# Patient Record
Sex: FEMALE | Race: WHITE | Employment: FULL TIME | URBAN - METROPOLITAN AREA
[De-identification: names, ages, dates, MRNs, and addresses within clinical notes are randomized per-mention and may not be internally consistent; named-entity substitution may affect disease eponyms.]

---

## 2017-07-20 ENCOUNTER — HOSPITAL ENCOUNTER (OUTPATIENT)
Dept: CT IMAGING | Age: 37
Discharge: HOME | End: 2017-07-20
Attending: FAMILY MEDICINE
Payer: COMMERCIAL

## 2017-07-20 DIAGNOSIS — S14.109A INJURY OF CERVICAL SPINE, INITIAL ENCOUNTER (HCC): ICD-10-CM

## 2017-07-20 DIAGNOSIS — S34.109A INJURY OF LUMBAR SPINE, INITIAL ENCOUNTER (HCC): ICD-10-CM

## 2017-07-20 DIAGNOSIS — M54.6 ACUTE MIDLINE THORACIC BACK PAIN: ICD-10-CM

## 2017-07-20 PROCEDURE — 72131 CT LUMBAR SPINE W/O DYE: CPT

## 2019-12-28 ENCOUNTER — HOSPITAL ENCOUNTER (EMERGENCY)
Age: 39
Discharge: HOME | End: 2019-12-28
Attending: EMERGENCY MEDICINE
Payer: COMMERCIAL

## 2019-12-28 ENCOUNTER — APPOINTMENT (OUTPATIENT)
Dept: CT IMAGING | Age: 39
End: 2019-12-28
Attending: EMERGENCY MEDICINE
Payer: COMMERCIAL

## 2019-12-28 VITALS
HEART RATE: 74 BPM | DIASTOLIC BLOOD PRESSURE: 59 MMHG | OXYGEN SATURATION: 97 % | HEIGHT: 67 IN | SYSTOLIC BLOOD PRESSURE: 102 MMHG | TEMPERATURE: 99 F | RESPIRATION RATE: 17 BRPM | WEIGHT: 168 LBS | BODY MASS INDEX: 26.37 KG/M2

## 2019-12-28 DIAGNOSIS — R10.31 ABDOMINAL PAIN, RIGHT LOWER QUADRANT: Primary | ICD-10-CM

## 2019-12-28 LAB
ALBUMIN SERPL-MCNC: 4.3 G/DL (ref 3.5–5)
ALBUMIN/GLOB SERPL: 1.4 {RATIO} (ref 1.2–3.5)
ALP SERPL-CCNC: 51 U/L (ref 50–136)
ALT SERPL-CCNC: 24 U/L (ref 12–65)
ANION GAP SERPL CALC-SCNC: 9 MMOL/L (ref 7–16)
AST SERPL-CCNC: 14 U/L (ref 15–37)
BASOPHILS # BLD: 0 K/UL (ref 0–0.2)
BASOPHILS NFR BLD: 1 % (ref 0–2)
BILIRUB SERPL-MCNC: 0.7 MG/DL (ref 0.2–1.1)
BUN SERPL-MCNC: 11 MG/DL (ref 6–23)
CALCIUM SERPL-MCNC: 9.5 MG/DL (ref 8.3–10.4)
CHLORIDE SERPL-SCNC: 108 MMOL/L (ref 98–107)
CO2 SERPL-SCNC: 23 MMOL/L (ref 21–32)
CREAT SERPL-MCNC: 0.83 MG/DL (ref 0.6–1)
DIFFERENTIAL METHOD BLD: ABNORMAL
EOSINOPHIL # BLD: 0.1 K/UL (ref 0–0.8)
EOSINOPHIL NFR BLD: 1 % (ref 0.5–7.8)
ERYTHROCYTE [DISTWIDTH] IN BLOOD BY AUTOMATED COUNT: 11.5 % (ref 11.9–14.6)
GLOBULIN SER CALC-MCNC: 3 G/DL (ref 2.3–3.5)
GLUCOSE SERPL-MCNC: 124 MG/DL (ref 65–100)
HCG UR QL: NEGATIVE
HCT VFR BLD AUTO: 41.3 % (ref 35.8–46.3)
HGB BLD-MCNC: 13.9 G/DL (ref 11.7–15.4)
IMM GRANULOCYTES # BLD AUTO: 0 K/UL (ref 0–0.5)
IMM GRANULOCYTES NFR BLD AUTO: 0 % (ref 0–5)
LYMPHOCYTES # BLD: 1.2 K/UL (ref 0.5–4.6)
LYMPHOCYTES NFR BLD: 14 % (ref 13–44)
MCH RBC QN AUTO: 30.8 PG (ref 26.1–32.9)
MCHC RBC AUTO-ENTMCNC: 33.7 G/DL (ref 31.4–35)
MCV RBC AUTO: 91.4 FL (ref 79.6–97.8)
MONOCYTES # BLD: 0.3 K/UL (ref 0.1–1.3)
MONOCYTES NFR BLD: 3 % (ref 4–12)
NEUTS SEG # BLD: 6.9 K/UL (ref 1.7–8.2)
NEUTS SEG NFR BLD: 81 % (ref 43–78)
NRBC # BLD: 0 K/UL (ref 0–0.2)
PLATELET # BLD AUTO: 145 K/UL (ref 150–450)
PMV BLD AUTO: 11.6 FL (ref 9.4–12.3)
POTASSIUM SERPL-SCNC: 4.3 MMOL/L (ref 3.5–5.1)
PROT SERPL-MCNC: 7.3 G/DL (ref 6.3–8.2)
RBC # BLD AUTO: 4.52 M/UL (ref 4.05–5.2)
SODIUM SERPL-SCNC: 140 MMOL/L (ref 136–145)
WBC # BLD AUTO: 8.5 K/UL (ref 4.3–11.1)

## 2019-12-28 PROCEDURE — 99284 EMERGENCY DEPT VISIT MOD MDM: CPT | Performed by: EMERGENCY MEDICINE

## 2019-12-28 PROCEDURE — 96374 THER/PROPH/DIAG INJ IV PUSH: CPT | Performed by: EMERGENCY MEDICINE

## 2019-12-28 PROCEDURE — 74176 CT ABD & PELVIS W/O CONTRAST: CPT

## 2019-12-28 PROCEDURE — 80053 COMPREHEN METABOLIC PANEL: CPT

## 2019-12-28 PROCEDURE — 81003 URINALYSIS AUTO W/O SCOPE: CPT | Performed by: EMERGENCY MEDICINE

## 2019-12-28 PROCEDURE — 81025 URINE PREGNANCY TEST: CPT

## 2019-12-28 PROCEDURE — 85025 COMPLETE CBC W/AUTO DIFF WBC: CPT

## 2019-12-28 PROCEDURE — 74011250636 HC RX REV CODE- 250/636: Performed by: EMERGENCY MEDICINE

## 2019-12-28 PROCEDURE — 96375 TX/PRO/DX INJ NEW DRUG ADDON: CPT | Performed by: EMERGENCY MEDICINE

## 2019-12-28 PROCEDURE — 96361 HYDRATE IV INFUSION ADD-ON: CPT | Performed by: EMERGENCY MEDICINE

## 2019-12-28 RX ORDER — ONDANSETRON 4 MG/1
4 TABLET, ORALLY DISINTEGRATING ORAL
Qty: 5 TAB | Refills: 0 | Status: SHIPPED | OUTPATIENT
Start: 2019-12-28 | End: 2020-02-10

## 2019-12-28 RX ORDER — OXYCODONE HYDROCHLORIDE 5 MG/1
5 TABLET ORAL
Qty: 7 TAB | Refills: 0 | Status: SHIPPED | OUTPATIENT
Start: 2019-12-28 | End: 2019-12-31

## 2019-12-28 RX ORDER — ONDANSETRON 2 MG/ML
4 INJECTION INTRAMUSCULAR; INTRAVENOUS
Status: COMPLETED | OUTPATIENT
Start: 2019-12-28 | End: 2019-12-28

## 2019-12-28 RX ORDER — TAMSULOSIN HYDROCHLORIDE 0.4 MG/1
0.4 CAPSULE ORAL DAILY
Qty: 15 CAP | Refills: 0 | Status: SHIPPED | OUTPATIENT
Start: 2019-12-28 | End: 2020-01-12

## 2019-12-28 RX ORDER — DICLOFENAC POTASSIUM 50 MG/1
50 TABLET, FILM COATED ORAL
Qty: 10 TAB | Refills: 0 | Status: SHIPPED | OUTPATIENT
Start: 2019-12-28 | End: 2020-02-10

## 2019-12-28 RX ORDER — KETOROLAC TROMETHAMINE 30 MG/ML
15 INJECTION, SOLUTION INTRAMUSCULAR; INTRAVENOUS
Status: COMPLETED | OUTPATIENT
Start: 2019-12-28 | End: 2019-12-28

## 2019-12-28 RX ADMIN — KETOROLAC TROMETHAMINE 15 MG: 30 INJECTION, SOLUTION INTRAMUSCULAR at 04:53

## 2019-12-28 RX ADMIN — SODIUM CHLORIDE 1000 ML: 900 INJECTION, SOLUTION INTRAVENOUS at 04:52

## 2019-12-28 RX ADMIN — ONDANSETRON 4 MG: 2 INJECTION INTRAMUSCULAR; INTRAVENOUS at 04:52

## 2019-12-28 NOTE — ED TRIAGE NOTES
Reports RLQ abdominal pain, n/v, chills. Onset approx 2200 last night. Denies diarrhea, urinary symptoms. Last normal BM 12/25 however reports constipation prior.

## 2019-12-28 NOTE — ED NOTES
I have reviewed discharge instructions with the patient. The patient verbalized understanding. Patient left ED via Discharge Method: ambulatory to Home with family. Opportunity for questions and clarification provided. Patient given 3 scripts. To continue your aftercare when you leave the hospital, you may receive an automated call from our care team to check in on how you are doing. This is a free service and part of our promise to provide the best care and service to meet your aftercare needs.  If you have questions, or wish to unsubscribe from this service please call [phone]. Thank you for Choosing our [address] [place] [address] Emergency Department.

## 2019-12-28 NOTE — ED PROVIDER NOTES
Patient is a 41 yo female who presents with abdominal pain. States pain in RLQ/right flank and began about 5-6 hours ago fairly suddenly. States no history of kidney stones and states recently seen by gynecology and no concerning ovarian cysts. States she has had similar pain in the past.  States associated nausea without vomiting at this time. No chest pain or SOB, no vaginal bleeding or discharge. Abdominal Pain    Associated symptoms include nausea. Pertinent negatives include no fever, no diarrhea, no vomiting, no dysuria, no headaches, no chest pain and no back pain.         Past Medical History:   Diagnosis Date    Abnormal Pap smear     Abnormal uterine bleeding (AUB) 2015    pt reports placenta accreta possibly    Adopted     Anemia     pt reports AUB, last hgb in 2015 was 7, today hgb 13 (9/25/15)    Missed      Postpartum depression     no medication at this time       Past Surgical History:   Procedure Laterality Date    HX DILATION AND CURETTAGE      HX DILATION AND EVACUATION  2015    with retained placenta    HX HYSTERECTOMY  9/29/15 / 2016    still has ovaries;  tubes removed     HX WISDOM TEETH EXTRACTION           Family History:   Problem Relation Age of Onset    Diabetes Mother         breast    Cancer Maternal Aunt         great maternal aunt  breast     Cancer Maternal Grandmother         breast  under 48       Social History     Socioeconomic History    Marital status:      Spouse name: Not on file    Number of children: Not on file    Years of education: Not on file    Highest education level: Not on file   Occupational History    Not on file   Social Needs    Financial resource strain: Not on file    Food insecurity:     Worry: Not on file     Inability: Not on file    Transportation needs:     Medical: Not on file     Non-medical: Not on file   Tobacco Use    Smoking status: Never Smoker    Smokeless tobacco: Never Used   Substance and Sexual Activity    Alcohol use: No    Drug use: No    Sexual activity: Yes   Lifestyle    Physical activity:     Days per week: Not on file     Minutes per session: Not on file    Stress: Not on file   Relationships    Social connections:     Talks on phone: Not on file     Gets together: Not on file     Attends Shinto service: Not on file     Active member of club or organization: Not on file     Attends meetings of clubs or organizations: Not on file     Relationship status: Not on file    Intimate partner violence:     Fear of current or ex partner: Not on file     Emotionally abused: Not on file     Physically abused: Not on file     Forced sexual activity: Not on file   Other Topics Concern    Not on file   Social History Narrative    Not on file         ALLERGIES: Lexapro [escitalopram] and Lortab [hydrocodone-acetaminophen]    Review of Systems   Constitutional: Negative for chills and fever. HENT: Negative for rhinorrhea and sore throat. Eyes: Negative for visual disturbance. Respiratory: Negative for cough and shortness of breath. Cardiovascular: Negative for chest pain and leg swelling. Gastrointestinal: Positive for abdominal pain and nausea. Negative for diarrhea and vomiting. Genitourinary: Positive for flank pain and pelvic pain. Negative for dysuria. Musculoskeletal: Negative for back pain and neck pain. Skin: Negative for rash. Neurological: Negative for weakness and headaches. Psychiatric/Behavioral: The patient is not nervous/anxious. Vitals:    12/28/19 0350   BP: (!) 156/110   Pulse: 82   Resp: 22   Temp: 97.9 °F (36.6 °C)   SpO2: 97%   Weight: 76.2 kg (168 lb)   Height: 5' 7\" (1.702 m)            Physical Exam  Vitals signs and nursing note reviewed. Constitutional:       Appearance: She is well-developed. HENT:      Head: Normocephalic.       Right Ear: External ear normal.      Left Ear: External ear normal.   Eyes:      Conjunctiva/sclera: Conjunctivae normal.      Pupils: Pupils are equal, round, and reactive to light. Neck:      Musculoskeletal: Normal range of motion and neck supple. Trachea: No tracheal deviation. Cardiovascular:      Rate and Rhythm: Normal rate and regular rhythm. Heart sounds: Normal heart sounds. No murmur. Pulmonary:      Effort: Pulmonary effort is normal. No respiratory distress. Breath sounds: Normal breath sounds. Abdominal:      Palpations: Abdomen is soft. Tenderness: There is tenderness (rlq tenderness and mild right flank tenderness). Musculoskeletal: Normal range of motion. Skin:     Findings: No rash. Neurological:      Mental Status: She is alert and oriented to person, place, and time. Cranial Nerves: No cranial nerve deficit.           MDM  Number of Diagnoses or Management Options  Abdominal pain, right lower quadrant: new and requires workup     Amount and/or Complexity of Data Reviewed  Clinical lab tests: ordered and reviewed  Tests in the radiology section of CPT®: ordered and reviewed  Tests in the medicine section of CPT®: ordered and reviewed  Review and summarize past medical records: yes    Risk of Complications, Morbidity, and/or Mortality  Presenting problems: high  Diagnostic procedures: high  Management options: high    Patient Progress  Patient progress: stable         Procedures  Recent Results (from the past 12 hour(s))   CBC WITH AUTOMATED DIFF    Collection Time: 12/28/19  4:01 AM   Result Value Ref Range    WBC 8.5 4.3 - 11.1 K/uL    RBC 4.52 4.05 - 5.2 M/uL    HGB 13.9 11.7 - 15.4 g/dL    HCT 41.3 35.8 - 46.3 %    MCV 91.4 79.6 - 97.8 FL    MCH 30.8 26.1 - 32.9 PG    MCHC 33.7 31.4 - 35.0 g/dL    RDW 11.5 (L) 11.9 - 14.6 %    PLATELET 116 (L) 737 - 450 K/uL    MPV 11.6 9.4 - 12.3 FL    ABSOLUTE NRBC 0.00 0.0 - 0.2 K/uL    DF AUTOMATED      NEUTROPHILS 81 (H) 43 - 78 %    LYMPHOCYTES 14 13 - 44 %    MONOCYTES 3 (L) 4.0 - 12.0 %    EOSINOPHILS 1 0.5 - 7.8 % BASOPHILS 1 0.0 - 2.0 %    IMMATURE GRANULOCYTES 0 0.0 - 5.0 %    ABS. NEUTROPHILS 6.9 1.7 - 8.2 K/UL    ABS. LYMPHOCYTES 1.2 0.5 - 4.6 K/UL    ABS. MONOCYTES 0.3 0.1 - 1.3 K/UL    ABS. EOSINOPHILS 0.1 0.0 - 0.8 K/UL    ABS. BASOPHILS 0.0 0.0 - 0.2 K/UL    ABS. IMM. GRANS. 0.0 0.0 - 0.5 K/UL   METABOLIC PANEL, COMPREHENSIVE    Collection Time: 12/28/19  4:01 AM   Result Value Ref Range    Sodium 140 136 - 145 mmol/L    Potassium 4.3 3.5 - 5.1 mmol/L    Chloride 108 (H) 98 - 107 mmol/L    CO2 23 21 - 32 mmol/L    Anion gap 9 7 - 16 mmol/L    Glucose 124 (H) 65 - 100 mg/dL    BUN 11 6 - 23 MG/DL    Creatinine 0.83 0.6 - 1.0 MG/DL    GFR est AA >60 >60 ml/min/1.73m2    GFR est non-AA >60 >60 ml/min/1.73m2    Calcium 9.5 8.3 - 10.4 MG/DL    Bilirubin, total 0.7 0.2 - 1.1 MG/DL    ALT (SGPT) 24 12 - 65 U/L    AST (SGOT) 14 (L) 15 - 37 U/L    Alk. phosphatase 51 50 - 136 U/L    Protein, total 7.3 6.3 - 8.2 g/dL    Albumin 4.3 3.5 - 5.0 g/dL    Globulin 3.0 2.3 - 3.5 g/dL    A-G Ratio 1.4 1.2 - 3.5     HCG URINE, QL. - POC    Collection Time: 12/28/19  4:14 AM   Result Value Ref Range    Pregnancy test,urine (POC) NEGATIVE  NEG       Ct Urogram Wo Cont    Result Date: 12/28/2019  CT ABDOMEN AND PELVIS WITHOUT CONTRAST HISTORY: Right lower quadrant pain COMPARISON: None TECHNIQUE: Helical imaging was performed from the lung bases through the ischial tuberosities without intravenous contrast. Oral contrast was not administered. Coronal and sagittal reformats were performed. Dose reduction techniques used: Automated exposure control, adjustment of the mAs and/or kVp according to patient's size, and iterative reconstruction techniques. FINDINGS: *  LUNG BASES: Within normal limits. *  LIVER: Within normal limits. *  GALLBLADDER AND BILE DUCTS: Normal. *  SPLEEN: Within normal limits. *  URINARY TRACT: Mild right hydronephrosis likely secondary to a 1 mm distal ureteral stone. *  ADRENALS: Within normal limits.  *  PANCREAS: Within normal limits. *  GASTROINTESTINAL TRACT: Normal appendix. *  RETROPERITONEUM: Within normal limits. *  PERITONEAL CAVITY AND ABDOMINAL WALL: Within normal limits. *  PELVIS: Within normal limits. *  SPINE / BONES: Within normal limits. *  OTHER COMMENTS: None. IMPRESSION: Mild right hydronephrosis likely secondary to a 1 mm distal ureteral stone. Evaluation of the abdominal viscera is limited without intravenous contrast. Date of Dictation: 12/28/2019 4:35 AM     41 yo female with kidney stone:    Patients pain treated with toradol in ED and home on short course pain medication as well as flomax. Urine without signs of infection, labs otherwise reassuring. Patient to follow up with urology in 2-3 days for recheck or return with any continued or worsening pain or any further concerns.

## 2022-07-11 ENCOUNTER — TELEPHONE (OUTPATIENT)
Dept: FAMILY MEDICINE CLINIC | Facility: CLINIC | Age: 42
End: 2022-07-11

## 2022-07-11 NOTE — TELEPHONE ENCOUNTER
----- Message from Fish Isbell MA sent at 7/11/2022  9:36 AM EDT -----  Subject: Message to Provider    QUESTIONS  Information for Provider? Patient would like for Dr. Sabine Ventura to see if he   could establish care for her , Salma Neumann. She has an appointment   with you on 9/29/22. Please call to let her know if this is possible.    Thanks.  ---------------------------------------------------------------------------  --------------  Surendra Rosales VVDR  [phone]; OK to leave message on voicemail  ---------------------------------------------------------------------------  --------------  SCRIPT ANSWERS  undefined

## 2022-09-29 ENCOUNTER — OFFICE VISIT (OUTPATIENT)
Dept: FAMILY MEDICINE CLINIC | Facility: CLINIC | Age: 42
End: 2022-09-29
Payer: COMMERCIAL

## 2022-09-29 ENCOUNTER — NURSE ONLY (OUTPATIENT)
Dept: FAMILY MEDICINE CLINIC | Facility: CLINIC | Age: 42
End: 2022-09-29

## 2022-09-29 VITALS
SYSTOLIC BLOOD PRESSURE: 126 MMHG | TEMPERATURE: 97.7 F | WEIGHT: 183.1 LBS | HEIGHT: 67 IN | OXYGEN SATURATION: 99 % | BODY MASS INDEX: 28.74 KG/M2 | DIASTOLIC BLOOD PRESSURE: 77 MMHG | RESPIRATION RATE: 18 BRPM | HEART RATE: 78 BPM

## 2022-09-29 DIAGNOSIS — Z00.00 WELL ADULT HEALTH CHECK: Primary | ICD-10-CM

## 2022-09-29 DIAGNOSIS — R63.5 WEIGHT GAIN: ICD-10-CM

## 2022-09-29 DIAGNOSIS — Z11.59 NEED FOR HEPATITIS C SCREENING TEST: ICD-10-CM

## 2022-09-29 DIAGNOSIS — Z13.220 LIPID SCREENING: ICD-10-CM

## 2022-09-29 DIAGNOSIS — Z11.4 ENCOUNTER FOR SCREENING FOR HIV: ICD-10-CM

## 2022-09-29 DIAGNOSIS — E66.3 OVERWEIGHT WITH BODY MASS INDEX (BMI) OF 28 TO 28.9 IN ADULT: ICD-10-CM

## 2022-09-29 DIAGNOSIS — D23.30 DERMOID CYST OF FACE: ICD-10-CM

## 2022-09-29 DIAGNOSIS — Z13.1 DIABETES MELLITUS SCREENING: ICD-10-CM

## 2022-09-29 DIAGNOSIS — Z00.00 WELL ADULT HEALTH CHECK: ICD-10-CM

## 2022-09-29 DIAGNOSIS — Z12.31 VISIT FOR SCREENING MAMMOGRAM: ICD-10-CM

## 2022-09-29 LAB
25(OH)D3 SERPL-MCNC: 26.6 NG/ML (ref 30–100)
ALBUMIN SERPL-MCNC: 4.9 G/DL (ref 3.5–5)
ALBUMIN/GLOB SERPL: 2.1 {RATIO} (ref 1.2–3.5)
ALP SERPL-CCNC: 49 U/L (ref 50–136)
ALT SERPL-CCNC: 21 U/L (ref 12–65)
ANION GAP SERPL CALC-SCNC: 6 MMOL/L (ref 4–13)
AST SERPL-CCNC: 8 U/L (ref 15–37)
BASOPHILS # BLD: 0 K/UL (ref 0–0.2)
BASOPHILS NFR BLD: 1 % (ref 0–2)
BILIRUB SERPL-MCNC: 0.8 MG/DL (ref 0.2–1.1)
BUN SERPL-MCNC: 14 MG/DL (ref 6–23)
CALCIUM SERPL-MCNC: 9.7 MG/DL (ref 8.3–10.4)
CHLORIDE SERPL-SCNC: 106 MMOL/L (ref 101–110)
CHOLEST SERPL-MCNC: 180 MG/DL
CO2 SERPL-SCNC: 27 MMOL/L (ref 21–32)
CREAT SERPL-MCNC: 0.7 MG/DL (ref 0.6–1)
DIFFERENTIAL METHOD BLD: ABNORMAL
EOSINOPHIL # BLD: 0.1 K/UL (ref 0–0.8)
EOSINOPHIL NFR BLD: 2 % (ref 0.5–7.8)
ERYTHROCYTE [DISTWIDTH] IN BLOOD BY AUTOMATED COUNT: 11.5 % (ref 11.9–14.6)
EST. AVERAGE GLUCOSE BLD GHB EST-MCNC: 100 MG/DL
GLOBULIN SER CALC-MCNC: 2.3 G/DL (ref 2.3–3.5)
GLUCOSE SERPL-MCNC: 92 MG/DL (ref 65–100)
HBA1C MFR BLD: 5.1 % (ref 4.8–5.6)
HCT VFR BLD AUTO: 44.9 % (ref 35.8–46.3)
HCV AB SER QL: NONREACTIVE
HDLC SERPL-MCNC: 56 MG/DL (ref 40–60)
HDLC SERPL: 3.2 {RATIO}
HGB BLD-MCNC: 14.5 G/DL (ref 11.7–15.4)
HIV 1+2 AB+HIV1 P24 AG SERPL QL IA: NONREACTIVE
HIV 1/2 RESULT COMMENT: NORMAL
IMM GRANULOCYTES # BLD AUTO: 0 K/UL (ref 0–0.5)
IMM GRANULOCYTES NFR BLD AUTO: 0 % (ref 0–5)
LDLC SERPL CALC-MCNC: 111.6 MG/DL
LYMPHOCYTES # BLD: 1.6 K/UL (ref 0.5–4.6)
LYMPHOCYTES NFR BLD: 29 % (ref 13–44)
MCH RBC QN AUTO: 30.7 PG (ref 26.1–32.9)
MCHC RBC AUTO-ENTMCNC: 32.3 G/DL (ref 31.4–35)
MCV RBC AUTO: 94.9 FL (ref 79.6–97.8)
MONOCYTES # BLD: 0.3 K/UL (ref 0.1–1.3)
MONOCYTES NFR BLD: 6 % (ref 4–12)
NEUTS SEG # BLD: 3.4 K/UL (ref 1.7–8.2)
NEUTS SEG NFR BLD: 62 % (ref 43–78)
NRBC # BLD: 0 K/UL (ref 0–0.2)
PLATELET # BLD AUTO: 254 K/UL (ref 150–450)
PMV BLD AUTO: 11.1 FL (ref 9.4–12.3)
POTASSIUM SERPL-SCNC: 4.1 MMOL/L (ref 3.5–5.1)
PROT SERPL-MCNC: 7.2 G/DL (ref 6.3–8.2)
RBC # BLD AUTO: 4.73 M/UL (ref 4.05–5.2)
SODIUM SERPL-SCNC: 139 MMOL/L (ref 136–145)
T4 FREE SERPL-MCNC: 1.1 NG/DL (ref 0.78–1.46)
TRIGL SERPL-MCNC: 62 MG/DL (ref 35–150)
TSH, 3RD GENERATION: 1.1 UIU/ML (ref 0.36–3.74)
VLDLC SERPL CALC-MCNC: 12.4 MG/DL (ref 6–23)
WBC # BLD AUTO: 5.4 K/UL (ref 4.3–11.1)

## 2022-09-29 PROCEDURE — 99396 PREV VISIT EST AGE 40-64: CPT | Performed by: FAMILY MEDICINE

## 2022-09-29 RX ORDER — PHENDIMETRAZINE TARTRATE 35 MG/1
TABLET ORAL
COMMUNITY
Start: 2022-09-07

## 2022-09-29 NOTE — PROGRESS NOTES
[address] [facility] [place], [place] [address]  Phone: [phone] Fax [phone]  Luda Zee. Marylen Mortimer M.D.  9/29/2022        Malcolm Villeda is a 38 y.o. female     HPI:  Patient is seen for Annual Exam (1 yr, )  Reports having gained about 30 pounds over the last year. She just started going to a bariatric clinic with medication as noted. She does walk 30 minutes every day. Tries to eat a healthy diet. States that recent thyroid levels were \"low\" but still in the normal range. Was told to have this repeated along with vitamin D. Her last mammogram was May 2021. She has not felt any abnormalities on self breast exam.      She describes a lesion of her left cheek that has become hard and uncomfortable. She also notices another lesion just lateral to her nose on the left that is red and \"comes and goes \". ROS:  Constitutional: denies excessive fatigue; no fever or chills; no polydipsia polyphagia or polyuria  HEENT: no nasal pressure or drainage, no otalgia or tinnitus or decreased hearing   Pulmonary: no dyspnea cough or wheeze  Cardiac: no chest pain or palpitations, no tachycardia, no PND or orthopnea. GI: no dyspepsia or reflux; normal bowel movements, no nausea or vomiting or diarrhea, no hematochezia or melena, no abdominal pain  : normal urination without dysuria or hematuria, nocturia or urinary hesitancy; no significant incontinence  MSK: no significant myalgias or arthralgias.   Neurological: no memory loss or trouble with balance or falls, no numbness or tingling or weakness  Psychiatric: no depression or anxiety, no insomnia, no suicidal or homicidal ideation  Skin: no rash or itching     Allergies   Allergen Reactions    Escitalopram Other (See Comments)     spacey and tinnitis    Hydrocodone-Acetaminophen Nausea And Vomiting       Active Ambulatory Problems     Diagnosis Date Noted    No Active Ambulatory Problems     Resolved Ambulatory Problems Diagnosis Date Noted    Endometritis following delivery 2012    Retained placenta 2012    Abnormal uterine bleeding, postpartum 2015    Missed  06/15/2012     Past Medical History:   Diagnosis Date    Abnormal Pap smear     Abnormal uterine bleeding (AUB) 2015    Adopted     Anemia     Postpartum depression         Past Surgical History:   Procedure Laterality Date    DILATION AND CURETTAGE OF UTERUS      DILATION AND EVACUATION OF UTERUS  2015    with retained placenta    HYSTERECTOMY (CERVIX STATUS UNKNOWN)  9/29/15 / Jossy Chacon     still has rt ovary;  tubes removed; not secondary to cancer    WISDOM TOOTH EXTRACTION        Family History   Adopted: Yes   Problem Relation Age of Onset    Cancer Father         legs removed    Cancer Maternal Grandmother         breast  under 48    Cancer Maternal Aunt         great maternal aunt  breast        Social History     Tobacco Use    Smoking status: Never    Smokeless tobacco: Never   Vaping Use    Vaping Use: Never used   Substance Use Topics    Alcohol use: Yes     Alcohol/week: 3.0 standard drinks     Types: 3 Shots of liquor per week    Drug use: No     Immunization History   Administered Date(s) Administered    COVID-19, PFIZER PURPLE top, DILUTE for use, (age 15 y+), 30mcg/0.3mL 2021, 2021, 2022    Influenza Virus Vaccine 10/17/2018    Influenza, AFLURIA, [place], (age 10-32 m), PF 2021    Tdap (Boostrix, Adacel) 2021       Physical Exam:  Blood pressure 126/77, pulse 78, temperature 97.7 °F (36.5 °C), temperature source Temporal, resp. rate 18, height 5' 7\" (1.702 m), weight 183 lb 1.6 oz (83.1 kg), SpO2 99 %. HEENT: TMs and external canals clear. EOMI. Nasal mucosa and oropharynx moist and intact. Neck: supple, no cervical adenopathy; no appreciable thyromegaly or thyroid nodules; appropriate carotid upstroke.    Lungs: normal inspiratory movement, clear with good breath sounds and no rales, wheezes, dermatologist in regards to persistent dermoid cyst of the face and possible actinic keratoses. Encouraged 150 minutes of low impact aerobic activity weekly. Also encouraged resistance training every other day with 24-48 hours of muscle glucose uptake subsequently. She will get her flu shot later. Reassess here in 1 year for physical or more quickly as needed. Discharge Meds:  Current Outpatient Medications   Medication Sig Dispense Refill    Phendimetrazine Tartrate 35 MG TABS TAKE 1 TABLET BY MOUTH FOUR TIMES A DAY       No current facility-administered medications for this visit. Return in about 1 year (around 9/29/2023) for CPX. Any new medications were explained today including any potential drug interactions or significant side effects. The patient's questions in regards to this were answered today. Dictated using voice recognition software.   Proofread, but unrecognized errors may exist.

## 2022-10-02 LAB — THYROPEROXIDASE AB SERPL-ACNC: 9 IU/ML (ref 0–34)

## 2022-12-02 ENCOUNTER — HOSPITAL ENCOUNTER (OUTPATIENT)
Dept: MAMMOGRAPHY | Age: 42
Discharge: HOME | End: 2022-12-02
Payer: COMMERCIAL

## 2022-12-02 DIAGNOSIS — Z12.31 VISIT FOR SCREENING MAMMOGRAM: ICD-10-CM

## 2022-12-02 PROCEDURE — 77067 SCR MAMMO BI INCL CAD: CPT

## 2023-07-24 ENCOUNTER — OFFICE VISIT (OUTPATIENT)
Dept: FAMILY MEDICINE CLINIC | Facility: CLINIC | Age: 43
End: 2023-07-24

## 2023-07-24 VITALS
SYSTOLIC BLOOD PRESSURE: 117 MMHG | DIASTOLIC BLOOD PRESSURE: 73 MMHG | RESPIRATION RATE: 18 BRPM | HEART RATE: 71 BPM | TEMPERATURE: 99 F | BODY MASS INDEX: 27.03 KG/M2 | WEIGHT: 172.2 LBS | OXYGEN SATURATION: 100 % | HEIGHT: 67 IN

## 2023-07-24 DIAGNOSIS — M79.644 THUMB PAIN, RIGHT: ICD-10-CM

## 2023-07-24 DIAGNOSIS — S60.011A CONTUSION OF RIGHT THUMB WITHOUT DAMAGE TO NAIL, INITIAL ENCOUNTER: Primary | ICD-10-CM

## 2023-07-24 SDOH — ECONOMIC STABILITY: FOOD INSECURITY: WITHIN THE PAST 12 MONTHS, THE FOOD YOU BOUGHT JUST DIDN'T LAST AND YOU DIDN'T HAVE MONEY TO GET MORE.: NEVER TRUE

## 2023-07-24 SDOH — ECONOMIC STABILITY: HOUSING INSECURITY
IN THE LAST 12 MONTHS, WAS THERE A TIME WHEN YOU DID NOT HAVE A STEADY PLACE TO SLEEP OR SLEPT IN A SHELTER (INCLUDING NOW)?: NO

## 2023-07-24 SDOH — ECONOMIC STABILITY: INCOME INSECURITY: HOW HARD IS IT FOR YOU TO PAY FOR THE VERY BASICS LIKE FOOD, HOUSING, MEDICAL CARE, AND HEATING?: NOT HARD AT ALL

## 2023-07-24 SDOH — ECONOMIC STABILITY: FOOD INSECURITY: WITHIN THE PAST 12 MONTHS, YOU WORRIED THAT YOUR FOOD WOULD RUN OUT BEFORE YOU GOT MONEY TO BUY MORE.: NEVER TRUE

## 2023-07-24 SDOH — ECONOMIC STABILITY: TRANSPORTATION INSECURITY
IN THE PAST 12 MONTHS, HAS LACK OF TRANSPORTATION KEPT YOU FROM MEETINGS, WORK, OR FROM GETTING THINGS NEEDED FOR DAILY LIVING?: NO

## 2023-07-24 ASSESSMENT — PATIENT HEALTH QUESTIONNAIRE - PHQ9
SUM OF ALL RESPONSES TO PHQ9 QUESTIONS 1 & 2: 0
SUM OF ALL RESPONSES TO PHQ QUESTIONS 1-9: 0
2. FEELING DOWN, DEPRESSED OR HOPELESS: NOT AT ALL
1. LITTLE INTEREST OR PLEASURE IN DOING THINGS: NOT AT ALL
SUM OF ALL RESPONSES TO PHQ QUESTIONS 1-9: 0
1. LITTLE INTEREST OR PLEASURE IN DOING THINGS: 0
2. FEELING DOWN, DEPRESSED OR HOPELESS: 0
SUM OF ALL RESPONSES TO PHQ9 QUESTIONS 1 & 2: 0
SUM OF ALL RESPONSES TO PHQ QUESTIONS 1-9: 0
SUM OF ALL RESPONSES TO PHQ QUESTIONS 1-9: 0

## 2023-07-24 NOTE — PROGRESS NOTES
[address], [address] [place] [address]  Phone: [phone] Fax [phone]  Rickie Roberts M.D.  2023        Vijay Oates is a 41 y.o. female     HPI:  Patient is seen for Hand Pain (Smashed right hand, can not  or squeeze anything)  Patient smashed her right hand at the base of her thumb into the hardware of a futon last evening while closing it. States that she felt like her thumb was flattened during the process. She is now having significant difficulty gripping or squeezing anything. States she had to use her left hand when cleaning herself after using the toilet. Has noticed some swelling and bruising at the base of the thumb. No radicular pain but does describe some tingling at the tip of her thumb. She has applied ice as well as taken some ibuprofen. Pain will sometimes extend up into the right lower forearm along the radial aspect.        Allergies   Allergen Reactions    Escitalopram Other (See Comments)     spacey and tinnitis    Hydrocodone-Acetaminophen Nausea And Vomiting       Active Ambulatory Problems     Diagnosis Date Noted    No Active Ambulatory Problems     Resolved Ambulatory Problems     Diagnosis Date Noted    Endometritis following delivery 2012    Retained placenta 2012    Abnormal uterine bleeding, postpartum 2015    Missed  06/15/2012     Past Medical History:   Diagnosis Date    Abnormal Pap smear     Abnormal uterine bleeding (AUB) 2015    Adopted     Anemia     Postpartum depression         Past Surgical History:   Procedure Laterality Date    DILATION AND CURETTAGE OF UTERUS      DILATION AND EVACUATION OF UTERUS  2015    with retained placenta    HYSTERECTOMY (CERVIX STATUS UNKNOWN)  9/29/15 / Noman Davila 2016    still has rt ovary;  tubes removed; not secondary to cancer    WISDOM TOOTH EXTRACTION          Social History     Tobacco Use    Smoking status: Never    Smokeless tobacco: Never

## 2023-09-05 ASSESSMENT — PATIENT HEALTH QUESTIONNAIRE - PHQ9
SUM OF ALL RESPONSES TO PHQ9 QUESTIONS 1 & 2: 0
SUM OF ALL RESPONSES TO PHQ QUESTIONS 1-9: 0
SUM OF ALL RESPONSES TO PHQ QUESTIONS 1-9: 0
2. FEELING DOWN, DEPRESSED OR HOPELESS: NOT AT ALL
1. LITTLE INTEREST OR PLEASURE IN DOING THINGS: 0
SUM OF ALL RESPONSES TO PHQ QUESTIONS 1-9: 0
1. LITTLE INTEREST OR PLEASURE IN DOING THINGS: NOT AT ALL
2. FEELING DOWN, DEPRESSED OR HOPELESS: 0
SUM OF ALL RESPONSES TO PHQ QUESTIONS 1-9: 0
SUM OF ALL RESPONSES TO PHQ9 QUESTIONS 1 & 2: 0

## 2023-09-08 ENCOUNTER — OFFICE VISIT (OUTPATIENT)
Dept: FAMILY MEDICINE CLINIC | Facility: CLINIC | Age: 43
End: 2023-09-08

## 2023-09-08 VITALS
HEART RATE: 74 BPM | WEIGHT: 170.3 LBS | HEIGHT: 67 IN | BODY MASS INDEX: 26.73 KG/M2 | SYSTOLIC BLOOD PRESSURE: 111 MMHG | TEMPERATURE: 98.2 F | RESPIRATION RATE: 18 BRPM | DIASTOLIC BLOOD PRESSURE: 66 MMHG | OXYGEN SATURATION: 100 %

## 2023-09-08 DIAGNOSIS — M25.552 LEFT HIP PAIN: ICD-10-CM

## 2023-09-08 DIAGNOSIS — M25.551 RIGHT HIP PAIN: Primary | ICD-10-CM

## 2023-09-08 DIAGNOSIS — E55.9 VITAMIN D DEFICIENCY: ICD-10-CM

## 2023-09-08 RX ORDER — MELOXICAM 15 MG/1
15 TABLET ORAL DAILY PRN
Qty: 30 TABLET | Refills: 2 | Status: SHIPPED | OUTPATIENT
Start: 2023-09-08

## 2023-09-08 RX ORDER — CHOLECALCIFEROL (VITAMIN D3) 1250 MCG
CAPSULE ORAL
COMMUNITY

## 2023-09-11 ENCOUNTER — PATIENT MESSAGE (OUTPATIENT)
Dept: FAMILY MEDICINE CLINIC | Facility: CLINIC | Age: 43
End: 2023-09-11

## 2023-09-12 NOTE — TELEPHONE ENCOUNTER
Elizabeth Osman [place] 9/12/2023 8:44 AM EDT      ----- Message -----  From: Viktor Hdez \"Tj\"  Sent: 9/11/2023 5:21 PM EDT  To: Fidelina Larry. TITO Osman  Subject: Results     Thank you- Do you know if the imaging disc is ready to pickup? I have the apt with Ortho on Friday Oct 6.

## 2023-10-06 ENCOUNTER — OFFICE VISIT (OUTPATIENT)
Dept: ORTHOPEDIC SURGERY | Age: 43
End: 2023-10-06

## 2023-10-06 VITALS — HEIGHT: 67 IN | WEIGHT: 173.2 LBS | BODY MASS INDEX: 27.18 KG/M2

## 2023-10-06 DIAGNOSIS — M25.551 RIGHT HIP PAIN: Primary | ICD-10-CM

## 2023-10-06 NOTE — PROGRESS NOTES
the hip. No evidence of arterial of venous insufficiency. DP/PT pulses appreciable. Able to plantar- and dorsi-flex the foot/ankle. Imaging:  XR HIP RT W OR WO PELVIS 2-3 VWS  Views Obtained: AP pelvis, lateral right hip  Indication: right Hip Pain  Findings:  Xrays including A/P Pelvis and lateral of the hip(s) were reviewed which demonstrate no evidence of significant osteoarthritis. The joint space appears well maintained and there is no significant osteophyte formation or presence of subchondral cysts. No acute fracture/process is appreciated. Impression: Normal appearing right hip    Santiago Goldberg MD          Assessment:   Right Hip Pain    Plan:  Differential diagnosis and treatment options have been discussed with the patient. Risks, benefits and alternatives of each were discussed and patient questions answered. At this point the patient would like to proceed with MRI of the hip to evaluate for internal derangement. Patient has symptoms consistent with hip pathology but no evidence of advanced degenerative changes on plain xrays. Will order MRI and followup with patient in office regarding results.           Signed By: Santiago Goldberg MD  October 6, 2023

## 2023-11-28 DIAGNOSIS — F41.9 ANXIETY: Primary | ICD-10-CM

## 2023-11-28 DIAGNOSIS — M25.551 RIGHT HIP PAIN: Primary | ICD-10-CM

## 2023-11-28 RX ORDER — LORAZEPAM 1 MG/1
1 TABLET ORAL EVERY 8 HOURS PRN
Qty: 2 TABLET | Refills: 0 | Status: SHIPPED | OUTPATIENT
Start: 2023-11-28 | End: 2023-12-28

## 2024-01-12 DIAGNOSIS — M25.551 RIGHT HIP PAIN: ICD-10-CM

## 2024-01-15 DIAGNOSIS — M25.551 RIGHT HIP PAIN: ICD-10-CM

## 2024-01-23 ENCOUNTER — OFFICE VISIT (OUTPATIENT)
Dept: ORTHOPEDIC SURGERY | Age: 44
End: 2024-01-23
Payer: COMMERCIAL

## 2024-01-23 DIAGNOSIS — S73.191A TEAR OF RIGHT ACETABULAR LABRUM, INITIAL ENCOUNTER: ICD-10-CM

## 2024-01-23 DIAGNOSIS — M25.859 FEMORAL ACETABULAR IMPINGEMENT: Primary | ICD-10-CM

## 2024-01-23 DIAGNOSIS — M25.551 RIGHT HIP PAIN: ICD-10-CM

## 2024-01-23 PROCEDURE — 99204 OFFICE O/P NEW MOD 45 MIN: CPT | Performed by: ORTHOPAEDIC SURGERY

## 2024-01-23 NOTE — PROGRESS NOTES
on [employer] which demonstrates anterior superior acetabular labral tearing no advanced degenerative changes.  All imaging interpreted by myself Jurgen Best MD independent of radiology review        Assessment:     ICD-10-CM    1. Femoral acetabular impingement  M25.859       2. Right hip pain  M25.551 Ambulatory referral to Physical Therapy      3. Tear of right acetabular labrum, initial encounter  S73.191A           Plan:   We had a long discussion about treatment options.  I do think she has some extra-articular symptoms that she would benefit from physical therapy.  We taught her some home exercise hip flexor mobilization and stretching program today.  However I do think she has an intra-articular source of pain with the acetabular labral tear and femoral acetabular impingement which is present on plain radiographs and MRI.  She feels this is the root cause and has continued to have classic symptoms of prolonged sitting pain and range of motion pain deep in her groin and C7 distribution.  She wants a definitive answer to this.  I think it is reasonable to consider surgical intervention.  We discussed continued conservative management with an injection and physical therapy this is the time a year she has time to be off of work and she wants to address this definitively.  We discussed details risk and benefits of hip arthroscopy with cam interim osteoplasty and labral repair versus debridement as well as the possibility of a reconstruction which I think is unlikely.  All of her questions have been answered she elects to proceed as planned.  We will see her back for preoperative appointment.    Surgical plan to be for right hip arthroscopy cam interim osteoplasty and labral repair              Jurgen Best MD, FAAOS  Orthopaedics and Sports Medicine

## 2024-01-24 DIAGNOSIS — S73.191A TEAR OF RIGHT ACETABULAR LABRUM, INITIAL ENCOUNTER: Primary | ICD-10-CM

## 2024-01-24 DIAGNOSIS — M25.551 RIGHT HIP PAIN: ICD-10-CM

## 2024-01-24 DIAGNOSIS — M25.859 FEMORAL ACETABULAR IMPINGEMENT: ICD-10-CM

## 2024-01-25 ENCOUNTER — HOSPITAL ENCOUNTER (OUTPATIENT)
Dept: PHYSICAL THERAPY | Age: 44
Setting detail: RECURRING SERIES
Discharge: HOME | End: 2024-01-28
Attending: ORTHOPAEDIC SURGERY
Payer: COMMERCIAL

## 2024-01-25 DIAGNOSIS — M25.551 RIGHT HIP PAIN: ICD-10-CM

## 2024-01-25 DIAGNOSIS — M25.652 STIFFNESS OF HIP JOINT, LEFT: ICD-10-CM

## 2024-01-25 DIAGNOSIS — S73.191A TEAR OF RIGHT ACETABULAR LABRUM, INITIAL ENCOUNTER: ICD-10-CM

## 2024-01-25 DIAGNOSIS — M25.859 FEMORAL ACETABULAR IMPINGEMENT: Primary | ICD-10-CM

## 2024-01-25 DIAGNOSIS — M25.552 LEFT HIP PAIN: ICD-10-CM

## 2024-01-25 DIAGNOSIS — S73.191A TEAR OF ACETABULAR LABRUM, RIGHT, INITIAL ENCOUNTER: ICD-10-CM

## 2024-01-25 DIAGNOSIS — M25.651 STIFFNESS OF HIP JOINT, RIGHT: ICD-10-CM

## 2024-01-25 DIAGNOSIS — M62.81 MUSCLE WEAKNESS (GENERALIZED): ICD-10-CM

## 2024-01-25 DIAGNOSIS — M25.551 HIP PAIN, RIGHT: Primary | ICD-10-CM

## 2024-01-25 PROCEDURE — 97161 PT EVAL LOW COMPLEX 20 MIN: CPT

## 2024-01-25 PROCEDURE — 97140 MANUAL THERAPY 1/> REGIONS: CPT

## 2024-01-25 PROCEDURE — 97110 THERAPEUTIC EXERCISES: CPT

## 2024-01-25 ASSESSMENT — PAIN SCALES - GENERAL: PAINLEVEL_OUTOF10: 0

## 2024-01-25 NOTE — PROGRESS NOTES
Jax Duncan  : 1980  Primary: [employer] (Commercial)  Secondary:  [facility] [place] [address]  Phone: [phone]  Fax: [phone] Plan Frequency: 1-2 times/week    Plan of Care/Certification Expiration Date: 24        Plan of Care/Certification Expiration Date:  Plan of Care/Certification Expiration Date: 24    Frequency/Duration:   Plan Frequency: 1-2 times/week      Time In/Out:   Time In: 930  Time Out: 1025      PT Visit Info:    Plan Frequency: 1-2 times/week      Visit Count:  1    OUTPATIENT PHYSICAL THERAPY:   Treatment Note 2024       Episode  (R hip pain)               Treatment Diagnosis:    Hip pain, right  Left hip pain  Muscle weakness (generalized)  Stiffness of hip joint, right  Stiffness of hip joint, left    Goals: (Goals have been discussed and agreed upon with patient.)  Short-Term Functional Goals: Time Frame: 8 weeks  Pt to report compliance with HEP  Pt to report allen to sitting with less pain, movement changes  Pt to allen R hip flexibility without guarding/pain  Pt to amb WNL level surfaces without AD  Discharge Goals: Time Frame: 20 weeks  Pt to increase strength for sit to stand x 30 reps without hip pain  Pt to increase strength for reciprocal gait on stairs with controlled descent  Pt to allen shopping without R hip pain limiting her    Medical/Referring Diagnosis:    Right hip pain [M25.551]    Referring Physician:  Jurgen Best MD MD Orders:  PT Eval and Treat   Return MD Appt:   surgery   Date of Onset:  Onset Date: 07/15/23     Allergies:   Escitalopram and Hydrocodone-acetaminophen  Restrictions/Precautions:   None      Interventions Planned (Treatment may consist of any combination of the following):     Functional Mobility Training, Gait Training, Home Exercise Program (HEP), Manual Therapy, Pain Management, Range of Motion (ROM), Therapeutic Exercise/Strengthening, and Aquatic Therapy

## 2024-01-25 NOTE — THERAPY EVALUATION
Saraystuart Duncan  : 1980  Primary: Umr (Commercial)  Secondary:  [facility] [place]  [address]  [place] [address]  Phone: [phone]  Fax: [phone] Plan Frequency: 1-2 times/week    Plan of Care/Certification Expiration Date: 24        Plan of Care/Certification Expiration Date:  Plan of Care/Certification Expiration Date: 24    Frequency/Duration: Plan Frequency: 1-2 times/week      Time In/Out:   Time In: 930  Time Out: 1025      PT Visit Info:    Plan Frequency: 1-2 times/week      Visit Count:  1                OUTPATIENT PHYSICAL THERAPY:             Initial Assessment 2024               Episode (R hip pain)         Treatment Diagnosis:    Hip pain, right  Left hip pain  Muscle weakness (generalized)  Stiffness of hip joint, right  Stiffness of hip joint, left  Medical/Referring Diagnosis:    Right hip pain [M25.551]    Referring Physician:  Jurgen Best MD MD Orders:  PT Eval and Treat   Return MD Appt:   surgery  Date of Onset:  Onset Date: 07/15/23     Allergies:  Escitalopram and Hydrocodone-acetaminophen  Restrictions/Precautions:    None      Medications Last Reviewed:  2024     SUBJECTIVE   History of Injury/Illness (Reason for Referral):  Pt reports B hip ant tightness most of her life.  Past 6 months R hip worse causing inability to allen activity even walking.  Lost flex in hips.  Doctor wants pt to gain stability in hip flex to be able to allen the surgery.  Both hips have popped her whole life.  Will have L done at some point. Prevents sleep.  Constantly changing positions due to pain while sitting at work.  Ex's from doc have caused intense pain the last 2 nights.  Surgery on R .  Patient Stated Goal(s):  \"flex and hip pain/stability R hip\"  Initial Pain Level:      0/10   Post Session Pain Level:     0/10  Past Medical History/Comorbidities:   Ms. Duncan  has a past medical history of Abnormal Pap smear, Abnormal

## 2024-01-30 ENCOUNTER — HOSPITAL ENCOUNTER (OUTPATIENT)
Dept: PHYSICAL THERAPY | Age: 44
Setting detail: RECURRING SERIES
Discharge: HOME | End: 2024-02-02
Attending: ORTHOPAEDIC SURGERY
Payer: COMMERCIAL

## 2024-01-30 PROCEDURE — 97110 THERAPEUTIC EXERCISES: CPT

## 2024-01-30 PROCEDURE — 97140 MANUAL THERAPY 1/> REGIONS: CPT

## 2024-01-30 NOTE — PROGRESS NOTES
Manual Therapy: 10 mins 25 mins      STM R hip flex/add supine supine              Aquatics Activities:        GAIT (F/B/S/M)        SLR gait        Hamstring Curl gait         Rockettes        Squats        Calf raises        Hamstring stretch B        Piriformis stretch B        Single leg squats B        SLS B        Deep well                        Therapeutic Exercises: 30 mins 15 mins      Pt education, postural education, HEP, functional breathing 20 mins  review of surgical precautions/after care      Bent knee fall outs to physical barrier  With STM for relaxation With STM for relaxation      Hip flex stretch R Standing,  Tiago       Quad stretch R Tiago        Piriformis stretch R seated       Clams R  X 15      HEP: see hand out       Treatment/Session Summary:    Treatment Assessment: Less palpable tightness with less guarded movements.  Communication/Consultation:  None today  Equipment provided today:  None  Recommendations/Intent for next treatment session: Next visit will focus on stretching, strengthening, manual techniques as indicated.    Total Treatment Billable Duration:  40 minutes   Time In: 1020  Time Out: 1100    CISCO STAFFORD, PT         Charge Capture  [employer] Portal  Appt Desk     Future Appointments   Date Time Provider Department Center   2/5/2024  8:00 AM Cisco Stafford, PT [facility]   2/7/2024  8:00 AM Cisco Stafford, PT [facility]   2/8/2024  8:00 AM Kayla Davidson PA [facility]   2/13/2024  8:00 AM Cisco Stafford, PT [facility]   2/15/2024  8:00 AM Cisco Stafford, PT [facility]   2/19/2024 10:15 AM Cisco Stafford, PT [facility]   2/22/2024  8:00 AM Cisco Stafford, PT [facility]   2/26/2024 10:15 AM Cisco Stafford, PT [facility]   2/27/2024 10:20 AM Kayla Davidson PA [facility]   2/29/2024  8:00 AM Cisco Stafford, PT [facility]   3/26/2024  8:20 AM Jurgen Best MD [facility]   9/13/2024 11:30 AM Daniel Cueto MD [facility]

## 2024-02-05 ENCOUNTER — HOSPITAL ENCOUNTER (OUTPATIENT)
Dept: PHYSICAL THERAPY | Age: 44
Setting detail: RECURRING SERIES
Discharge: HOME | End: 2024-02-08
Attending: ORTHOPAEDIC SURGERY
Payer: COMMERCIAL

## 2024-02-05 PROCEDURE — 97140 MANUAL THERAPY 1/> REGIONS: CPT

## 2024-02-05 PROCEDURE — 97110 THERAPEUTIC EXERCISES: CPT

## 2024-02-05 NOTE — PROGRESS NOTES
Jax Duncan  : 1980  Primary: Umr (Commercial)  Secondary:  [facility] [place] [address]  Phone: [phone]  Fax: [phone] Plan Frequency: 1-2 times/week    Plan of Care/Certification Expiration Date: 24        Plan of Care/Certification Expiration Date:  Plan of Care/Certification Expiration Date: 24    Frequency/Duration:   Plan Frequency: 1-2 times/week      Time In/Out:   Time In: 0800  Time Out: 0845      PT Visit Info:    Plan Frequency: 1-2 times/week      Visit Count:  3    OUTPATIENT PHYSICAL THERAPY:   Treatment Note 2024       Episode  (R hip pain)               Treatment Diagnosis:    Hip pain, right  Left hip pain  Muscle weakness (generalized)  Stiffness of hip joint, right  Stiffness of hip joint, left    Goals: (Goals have been discussed and agreed upon with patient.)  Short-Term Functional Goals: Time Frame: 8 weeks  Pt to report compliance with HEP  Pt to report allen to sitting with less pain, movement changes  Pt to allen R hip flexibility without guarding/pain  Pt to amb WNL level surfaces without AD  Discharge Goals: Time Frame: 20 weeks  Pt to increase strength for sit to stand x 30 reps without hip pain  Pt to increase strength for reciprocal gait on stairs with controlled descent  Pt to allen shopping without R hip pain limiting her    Medical/Referring Diagnosis:    Right hip pain [M25.551]    Referring Physician:  Jurgen Best MD MD Orders:  PT Eval and Treat   Return MD Appt:   surgery   Date of Onset:  Onset Date: 07/15/23     Allergies:   Escitalopram and Hydrocodone-acetaminophen  Restrictions/Precautions:   None      Interventions Planned (Treatment may consist of any combination of the following):     Functional Mobility Training, Gait Training, Home Exercise Program (HEP), Manual Therapy, Pain Management, Range of Motion (ROM), Therapeutic Exercise/Strengthening, and Aquatic Therapy

## 2024-02-07 ENCOUNTER — HOSPITAL ENCOUNTER (OUTPATIENT)
Dept: PHYSICAL THERAPY | Age: 44
Setting detail: RECURRING SERIES
Discharge: HOME | End: 2024-02-10
Attending: ORTHOPAEDIC SURGERY
Payer: COMMERCIAL

## 2024-02-07 PROCEDURE — 97140 MANUAL THERAPY 1/> REGIONS: CPT

## 2024-02-07 PROCEDURE — 97110 THERAPEUTIC EXERCISES: CPT

## 2024-02-07 NOTE — H&P (VIEW-ONLY)
Name: Jax Duncan  YOB: 1980  Gender: female  MRN: [id number]    CC: Hip Pain (R)      HPI: Jax Duncan is a 43 y.o. female who presents with right hip pain. She is here today for a preop appointment. She will be given an iceman and crutches. She will go to [facility] for rehab following surgery. CPM machine has been ordered.      Current Outpatient Medications:     Cholecalciferol (VITAMIN D3) 1.25 MG (31882 UT) CAPS, Take by mouth, Disp: , Rfl:     Multiple Vitamins-Minerals (MULTIVITAMIN WOMEN PO), Take by mouth, Disp: , Rfl:     meloxicam (MOBIC) 15 MG tablet, Take 1 tablet by mouth daily as needed for Pain, Disp: 30 tablet, Rfl: 2  Allergies   Allergen Reactions    Escitalopram Other (See Comments)     spacey and tinnitis    Hydrocodone-Acetaminophen Nausea And Vomiting     Past Medical History:   Diagnosis Date    Abnormal Pap smear     Abnormal uterine bleeding (AUB) 2015    pt reports placenta accreta possibly    Adopted     Anemia     pt reports AUB, last hgb in 2015 was 7, today hgb 13 (9/25/15)    Missed      Postpartum depression     no medication at this time     Past Surgical History:   Procedure Laterality Date    DILATION AND CURETTAGE OF UTERUS      DILATION AND EVACUATION OF UTERUS  2015    with retained placenta    HYSTERECTOMY (CERVIX STATUS UNKNOWN)  9/29/15 / 2016    still has rt ovary;  tubes removed; not secondary to cancer    WISDOM TOOTH EXTRACTION       Family History   Adopted: Yes   Problem Relation Age of Onset    Cancer Father         legs removed    Cancer Maternal Grandmother         breast  under 50    Breast Cancer Paternal Grandmother     Breast Cancer Maternal Aunt     Cancer Maternal Aunt         great maternal aunt  breast      Social History     Socioeconomic History    Marital status:      Spouse name: Not on file    Number of children: Not on file    Years of education: Not on file    Highest education level: Not on

## 2024-02-07 NOTE — PROGRESS NOTES
Name: Jax Duncan  YOB: 1980  Gender: female  MRN: [id number]    CC: Hip Pain (R)      HPI: Jax Duncan is a 43 y.o. female who presents with right hip pain. She is here today for a preop appointment. She will be given an iceman and crutches. She will go to [facility] for rehab following surgery. CPM machine has been ordered.      Current Outpatient Medications:     Cholecalciferol (VITAMIN D3) 1.25 MG (75035 UT) CAPS, Take by mouth, Disp: , Rfl:     Multiple Vitamins-Minerals (MULTIVITAMIN WOMEN PO), Take by mouth, Disp: , Rfl:     meloxicam (MOBIC) 15 MG tablet, Take 1 tablet by mouth daily as needed for Pain, Disp: 30 tablet, Rfl: 2  Allergies   Allergen Reactions    Escitalopram Other (See Comments)     spacey and tinnitis    Hydrocodone-Acetaminophen Nausea And Vomiting     Past Medical History:   Diagnosis Date    Abnormal Pap smear     Abnormal uterine bleeding (AUB) 2015    pt reports placenta accreta possibly    Adopted     Anemia     pt reports AUB, last hgb in 2015 was 7, today hgb 13 (9/25/15)    Missed      Postpartum depression     no medication at this time     Past Surgical History:   Procedure Laterality Date    DILATION AND CURETTAGE OF UTERUS      DILATION AND EVACUATION OF UTERUS  2015    with retained placenta    HYSTERECTOMY (CERVIX STATUS UNKNOWN)  9/29/15 / 2016    still has rt ovary;  tubes removed; not secondary to cancer    WISDOM TOOTH EXTRACTION       Family History   Adopted: Yes   Problem Relation Age of Onset    Cancer Father         legs removed    Cancer Maternal Grandmother         breast  under 50    Breast Cancer Paternal Grandmother     Breast Cancer Maternal Aunt     Cancer Maternal Aunt         great maternal aunt  breast      Social History     Socioeconomic History    Marital status:      Spouse name: Not on file    Number of children: Not on file    Years of education: Not on file    Highest education level: Not on

## 2024-02-08 ENCOUNTER — OFFICE VISIT (OUTPATIENT)
Dept: ORTHOPEDIC SURGERY | Age: 44
End: 2024-02-08

## 2024-02-08 DIAGNOSIS — S73.191A TEAR OF RIGHT ACETABULAR LABRUM, INITIAL ENCOUNTER: ICD-10-CM

## 2024-02-08 DIAGNOSIS — Z01.818 PREOP EXAMINATION: ICD-10-CM

## 2024-02-08 DIAGNOSIS — M25.551 RIGHT HIP PAIN: Primary | ICD-10-CM

## 2024-02-08 DIAGNOSIS — M25.859 FEMORAL ACETABULAR IMPINGEMENT: ICD-10-CM

## 2024-02-08 RX ORDER — ONDANSETRON 4 MG/1
4 TABLET, FILM COATED ORAL 3 TIMES DAILY PRN
Qty: 15 TABLET | Refills: 0 | Status: SHIPPED | OUTPATIENT
Start: 2024-02-08

## 2024-02-08 RX ORDER — OXYCODONE HYDROCHLORIDE 5 MG/1
5 TABLET ORAL EVERY 6 HOURS PRN
Qty: 30 TABLET | Refills: 0 | Status: SHIPPED | OUTPATIENT
Start: 2024-02-08 | End: 2024-02-13

## 2024-02-08 RX ORDER — MELOXICAM 7.5 MG/1
7.5 TABLET ORAL DAILY PRN
Qty: 30 TABLET | Refills: 0 | Status: SHIPPED | OUTPATIENT
Start: 2024-02-08

## 2024-02-08 RX ORDER — INDOMETHACIN 75 MG/1
75 CAPSULE, EXTENDED RELEASE ORAL
Qty: 3 CAPSULE | Refills: 0 | Status: SHIPPED | OUTPATIENT
Start: 2024-02-08

## 2024-02-13 ENCOUNTER — ANESTHESIA EVENT (OUTPATIENT)
Dept: SURGERY | Age: 44
End: 2024-02-13
Payer: COMMERCIAL

## 2024-02-13 ENCOUNTER — HOSPITAL ENCOUNTER (OUTPATIENT)
Dept: PHYSICAL THERAPY | Age: 44
Setting detail: RECURRING SERIES
Discharge: HOME | End: 2024-02-16
Attending: ORTHOPAEDIC SURGERY
Payer: COMMERCIAL

## 2024-02-13 PROCEDURE — 97110 THERAPEUTIC EXERCISES: CPT

## 2024-02-13 PROCEDURE — 97140 MANUAL THERAPY 1/> REGIONS: CPT

## 2024-02-13 RX ORDER — INDOMETHACIN 75 MG/1
75 CAPSULE, EXTENDED RELEASE ORAL 2 TIMES DAILY WITH MEALS
COMMUNITY

## 2024-02-13 NOTE — PROGRESS NOTES
Patient verified name and .  Order for consent not found in EHR and matches case posting; patient verifies procedure.   Type 1B surgery, phone assessment complete.  Orders not received.  Labs per surgeon: none  Labs per anesthesia protocol: none    Patient answered medical/surgical history questions at their best of ability. All prior to admission medications documented in EPIC.  Patient instructed to take the following medications the day of surgery according to anesthesia guidelines with a small sip of water: none On the day before surgery please take 2 Tylenol in the morning and then again before bed. You may use either regular or extra strength.   Hold all vitamins 7 days prior to surgery and NSAIDS 5 days prior to surgery. Prescription meds to hold:none  Patient instructed on the following:    > Arrive at OP Entrance, time of arrival to be called the day before by 1700  > NPO after midnight, unless otherwise indicated, including gum, mints, and ice chips  > Responsible adult must drive patient to the hospital, stay during surgery, and patient will need supervision 24 hours after anesthesia  > Use non moisturizing soap in shower the night before surgery and on the morning of surgery  > All piercings must be removed prior to arrival.    > Leave all valuables (money and jewelry) at home but bring insurance card and ID on DOS.   > You may be required to pay a deductible or co-pay on the day of your procedure. You can pre-pay by calling [phone] if your surgery is at the [facility] or [phone] [facility].  > Do not wear make-up, nail polish, lotions, cologne, perfumes, powders, or oil on skin. Artificial nails are not permitted.

## 2024-02-13 NOTE — PROGRESS NOTES
Jax Duncan  : 1980  Primary: Umr (Commercial)  Secondary:  [facility] [place] [address]  Phone: [phone]  Fax: [phone] Plan Frequency: 1-2 times/week    Plan of Care/Certification Expiration Date: 24        Plan of Care/Certification Expiration Date:  Plan of Care/Certification Expiration Date: 24    Frequency/Duration:   Plan Frequency: 1-2 times/week      Time In/Out:   Time In: 0800  Time Out: 0845      PT Visit Info:    Plan Frequency: 1-2 times/week      Visit Count:  5    OUTPATIENT PHYSICAL THERAPY:   Treatment Note 2024       Episode  (R hip pain)               Treatment Diagnosis:    Hip pain, right  Left hip pain  Muscle weakness (generalized)  Stiffness of hip joint, right  Stiffness of hip joint, left    Goals: (Goals have been discussed and agreed upon with patient.)  Short-Term Functional Goals: Time Frame: 8 weeks  Pt to report compliance with HEP  Pt to report allen to sitting with less pain, movement changes  Pt to allen R hip flexibility without guarding/pain  Pt to amb WNL level surfaces without AD  Discharge Goals: Time Frame: 20 weeks  Pt to increase strength for sit to stand x 30 reps without hip pain  Pt to increase strength for reciprocal gait on stairs with controlled descent  Pt to allen shopping without R hip pain limiting her    Medical/Referring Diagnosis:    Right hip pain [M25.551]    Referring Physician:  Jurgen Best MD MD Orders:  PT Eval and Treat   Return MD Appt:   surgery   Date of Onset:  Onset Date: 07/15/23     Allergies:   Escitalopram and Hydrocodone-acetaminophen  Restrictions/Precautions:   None      Interventions Planned (Treatment may consist of any combination of the following):     Functional Mobility Training, Gait Training, Home Exercise Program (HEP), Manual Therapy, Pain Management, Range of Motion (ROM), Therapeutic Exercise/Strengthening, and Aquatic Therapy

## 2024-02-13 NOTE — PERIOP NOTE
Preop department called to notify patient of arrival time for scheduled procedure. Instructions given to   - Arrive at OPC Entrance [address].  - Remain NPO after midnight, unless otherwise indicated, including gum, mints, and ice chips.   - Have a responsible adult to drive patient to the hospital, stay during surgery, and patient will need supervision 24 hours after anesthesia.   - Use antibacterial soap in shower the night before surgery and on the morning of surgery.       Was patient contacted: PT  Voicemail left:   Numbers contacted: [phone]   Arrival time: 1100

## 2024-02-14 ENCOUNTER — HOSPITAL ENCOUNTER (OUTPATIENT)
Age: 44
Setting detail: OUTPATIENT SURGERY
Discharge: HOME | End: 2024-02-14
Attending: ORTHOPAEDIC SURGERY | Admitting: ORTHOPAEDIC SURGERY
Payer: COMMERCIAL

## 2024-02-14 ENCOUNTER — ANESTHESIA (OUTPATIENT)
Dept: SURGERY | Age: 44
End: 2024-02-14
Payer: COMMERCIAL

## 2024-02-14 ENCOUNTER — APPOINTMENT (OUTPATIENT)
Dept: GENERAL RADIOLOGY | Age: 44
End: 2024-02-14
Attending: ORTHOPAEDIC SURGERY
Payer: COMMERCIAL

## 2024-02-14 VITALS
OXYGEN SATURATION: 100 % | HEIGHT: 67 IN | TEMPERATURE: 97.8 F | DIASTOLIC BLOOD PRESSURE: 62 MMHG | WEIGHT: 172 LBS | HEART RATE: 84 BPM | SYSTOLIC BLOOD PRESSURE: 120 MMHG | RESPIRATION RATE: 14 BRPM | BODY MASS INDEX: 27 KG/M2

## 2024-02-14 PROCEDURE — 7100000001 HC PACU RECOVERY - ADDTL 15 MIN: Performed by: ORTHOPAEDIC SURGERY

## 2024-02-14 PROCEDURE — 2580000003 HC RX 258: Performed by: ANESTHESIOLOGY

## 2024-02-14 PROCEDURE — 7100000011 HC PHASE II RECOVERY - ADDTL 15 MIN: Performed by: ORTHOPAEDIC SURGERY

## 2024-02-14 PROCEDURE — 3600000014 HC SURGERY LEVEL 4 ADDTL 15MIN: Performed by: ORTHOPAEDIC SURGERY

## 2024-02-14 PROCEDURE — 6370000000 HC RX 637 (ALT 250 FOR IP): Performed by: ANESTHESIOLOGY

## 2024-02-14 PROCEDURE — 3700000001 HC ADD 15 MINUTES (ANESTHESIA): Performed by: ORTHOPAEDIC SURGERY

## 2024-02-14 PROCEDURE — 2709999900 HC NON-CHARGEABLE SUPPLY: Performed by: ORTHOPAEDIC SURGERY

## 2024-02-14 PROCEDURE — 6360000002 HC RX W HCPCS: Performed by: ANESTHESIOLOGY

## 2024-02-14 PROCEDURE — 3600000004 HC SURGERY LEVEL 4 BASE: Performed by: ORTHOPAEDIC SURGERY

## 2024-02-14 PROCEDURE — C1713 ANCHOR/SCREW BN/BN,TIS/BN: HCPCS | Performed by: ORTHOPAEDIC SURGERY

## 2024-02-14 PROCEDURE — 6360000002 HC RX W HCPCS: Performed by: ORTHOPAEDIC SURGERY

## 2024-02-14 PROCEDURE — 7100000010 HC PHASE II RECOVERY - FIRST 15 MIN: Performed by: ORTHOPAEDIC SURGERY

## 2024-02-14 PROCEDURE — 7100000000 HC PACU RECOVERY - FIRST 15 MIN: Performed by: ORTHOPAEDIC SURGERY

## 2024-02-14 PROCEDURE — 2500000003 HC RX 250 WO HCPCS

## 2024-02-14 PROCEDURE — 2580000003 HC RX 258: Performed by: ORTHOPAEDIC SURGERY

## 2024-02-14 PROCEDURE — 6360000002 HC RX W HCPCS: Performed by: PHYSICIAN ASSISTANT

## 2024-02-14 PROCEDURE — 2720000010 HC SURG SUPPLY STERILE: Performed by: ORTHOPAEDIC SURGERY

## 2024-02-14 PROCEDURE — 2720000002 HC MISC SURG SUPPLY STERILE >$500: Performed by: ORTHOPAEDIC SURGERY

## 2024-02-14 PROCEDURE — 6360000002 HC RX W HCPCS

## 2024-02-14 PROCEDURE — 3700000000 HC ANESTHESIA ATTENDED CARE: Performed by: ORTHOPAEDIC SURGERY

## 2024-02-14 DEVICE — SUTURE ANCHOR 2.4MM HIP PEEK PUSHLOCK: Type: IMPLANTABLE DEVICE | Site: HIP | Status: FUNCTIONAL

## 2024-02-14 RX ORDER — ROCURONIUM BROMIDE 10 MG/ML
INJECTION, SOLUTION INTRAVENOUS PRN
Status: DISCONTINUED | OUTPATIENT
Start: 2024-02-14 | End: 2024-02-14 | Stop reason: SDUPTHER

## 2024-02-14 RX ORDER — KETOROLAC TROMETHAMINE 30 MG/ML
INJECTION, SOLUTION INTRAMUSCULAR; INTRAVENOUS PRN
Status: DISCONTINUED | OUTPATIENT
Start: 2024-02-14 | End: 2024-02-14 | Stop reason: HOSPADM

## 2024-02-14 RX ORDER — ONDANSETRON 2 MG/ML
4 INJECTION INTRAMUSCULAR; INTRAVENOUS
Status: DISCONTINUED | OUTPATIENT
Start: 2024-02-14 | End: 2024-02-14 | Stop reason: HOSPADM

## 2024-02-14 RX ORDER — FENTANYL CITRATE 50 UG/ML
100 INJECTION, SOLUTION INTRAMUSCULAR; INTRAVENOUS
Status: DISCONTINUED | OUTPATIENT
Start: 2024-02-14 | End: 2024-02-14 | Stop reason: HOSPADM

## 2024-02-14 RX ORDER — PROPOFOL 10 MG/ML
INJECTION, EMULSION INTRAVENOUS PRN
Status: DISCONTINUED | OUTPATIENT
Start: 2024-02-14 | End: 2024-02-14 | Stop reason: SDUPTHER

## 2024-02-14 RX ORDER — SODIUM CHLORIDE 0.9 % (FLUSH) 0.9 %
5-40 SYRINGE (ML) INJECTION PRN
Status: DISCONTINUED | OUTPATIENT
Start: 2024-02-14 | End: 2024-02-14 | Stop reason: HOSPADM

## 2024-02-14 RX ORDER — ACETAMINOPHEN 500 MG
1000 TABLET ORAL ONCE
Status: COMPLETED | OUTPATIENT
Start: 2024-02-14 | End: 2024-02-14

## 2024-02-14 RX ORDER — OXYCODONE HYDROCHLORIDE 5 MG/1
5 TABLET ORAL
Status: COMPLETED | OUTPATIENT
Start: 2024-02-14 | End: 2024-02-14

## 2024-02-14 RX ORDER — 0.9 % SODIUM CHLORIDE 0.9 %
INTRAVENOUS SOLUTION INTRAVENOUS CONTINUOUS PRN
Status: DISCONTINUED | OUTPATIENT
Start: 2024-02-14 | End: 2024-02-14 | Stop reason: HOSPADM

## 2024-02-14 RX ORDER — SCOLOPAMINE TRANSDERMAL SYSTEM 1 MG/1
1 PATCH, EXTENDED RELEASE TRANSDERMAL ONCE
Status: DISCONTINUED | OUTPATIENT
Start: 2024-02-14 | End: 2024-02-14 | Stop reason: HOSPADM

## 2024-02-14 RX ORDER — PROCHLORPERAZINE EDISYLATE 5 MG/ML
5 INJECTION INTRAMUSCULAR; INTRAVENOUS
Status: DISCONTINUED | OUTPATIENT
Start: 2024-02-14 | End: 2024-02-14 | Stop reason: HOSPADM

## 2024-02-14 RX ORDER — SODIUM CHLORIDE 9 MG/ML
INJECTION, SOLUTION INTRAVENOUS PRN
Status: DISCONTINUED | OUTPATIENT
Start: 2024-02-14 | End: 2024-02-14 | Stop reason: HOSPADM

## 2024-02-14 RX ORDER — ROPIVACAINE HYDROCHLORIDE 5 MG/ML
INJECTION, SOLUTION EPIDURAL; INFILTRATION; PERINEURAL PRN
Status: DISCONTINUED | OUTPATIENT
Start: 2024-02-14 | End: 2024-02-14 | Stop reason: HOSPADM

## 2024-02-14 RX ORDER — SODIUM CHLORIDE 0.9 % (FLUSH) 0.9 %
5-40 SYRINGE (ML) INJECTION EVERY 12 HOURS SCHEDULED
Status: DISCONTINUED | OUTPATIENT
Start: 2024-02-14 | End: 2024-02-14 | Stop reason: HOSPADM

## 2024-02-14 RX ORDER — HYDROMORPHONE HYDROCHLORIDE 2 MG/ML
0.5 INJECTION, SOLUTION INTRAMUSCULAR; INTRAVENOUS; SUBCUTANEOUS EVERY 5 MIN PRN
Status: DISCONTINUED | OUTPATIENT
Start: 2024-02-14 | End: 2024-02-14 | Stop reason: HOSPADM

## 2024-02-14 RX ORDER — EPINEPHRINE 1 MG/ML(1)
AMPUL (ML) INJECTION PRN
Status: DISCONTINUED | OUTPATIENT
Start: 2024-02-14 | End: 2024-02-14 | Stop reason: HOSPADM

## 2024-02-14 RX ORDER — LIDOCAINE HYDROCHLORIDE 10 MG/ML
1 INJECTION, SOLUTION INFILTRATION; PERINEURAL
Status: DISCONTINUED | OUTPATIENT
Start: 2024-02-14 | End: 2024-02-14 | Stop reason: HOSPADM

## 2024-02-14 RX ORDER — DEXAMETHASONE SODIUM PHOSPHATE 4 MG/ML
INJECTION, SOLUTION INTRA-ARTICULAR; INTRALESIONAL; INTRAMUSCULAR; INTRAVENOUS; SOFT TISSUE PRN
Status: DISCONTINUED | OUTPATIENT
Start: 2024-02-14 | End: 2024-02-14 | Stop reason: SDUPTHER

## 2024-02-14 RX ORDER — NEOSTIGMINE METHYLSULFATE 1 MG/ML
INJECTION, SOLUTION INTRAVENOUS PRN
Status: DISCONTINUED | OUTPATIENT
Start: 2024-02-14 | End: 2024-02-14 | Stop reason: SDUPTHER

## 2024-02-14 RX ORDER — HYDROMORPHONE HYDROCHLORIDE 2 MG/ML
INJECTION, SOLUTION INTRAMUSCULAR; INTRAVENOUS; SUBCUTANEOUS PRN
Status: DISCONTINUED | OUTPATIENT
Start: 2024-02-14 | End: 2024-02-14 | Stop reason: SDUPTHER

## 2024-02-14 RX ORDER — ONDANSETRON 2 MG/ML
INJECTION INTRAMUSCULAR; INTRAVENOUS PRN
Status: DISCONTINUED | OUTPATIENT
Start: 2024-02-14 | End: 2024-02-14 | Stop reason: SDUPTHER

## 2024-02-14 RX ORDER — MIDAZOLAM HYDROCHLORIDE 2 MG/2ML
1 INJECTION, SOLUTION INTRAMUSCULAR; INTRAVENOUS EVERY 10 MIN PRN
Status: DISCONTINUED | OUTPATIENT
Start: 2024-02-14 | End: 2024-02-14 | Stop reason: HOSPADM

## 2024-02-14 RX ORDER — KETAMINE HYDROCHLORIDE 50 MG/ML
INJECTION, SOLUTION INTRAMUSCULAR; INTRAVENOUS PRN
Status: DISCONTINUED | OUTPATIENT
Start: 2024-02-14 | End: 2024-02-14 | Stop reason: SDUPTHER

## 2024-02-14 RX ORDER — GLYCOPYRROLATE 0.2 MG/ML
INJECTION INTRAMUSCULAR; INTRAVENOUS PRN
Status: DISCONTINUED | OUTPATIENT
Start: 2024-02-14 | End: 2024-02-14 | Stop reason: SDUPTHER

## 2024-02-14 RX ORDER — SODIUM CHLORIDE 9 MG/ML
INJECTION, SOLUTION INTRAVENOUS CONTINUOUS
Status: DISCONTINUED | OUTPATIENT
Start: 2024-02-14 | End: 2024-02-14 | Stop reason: HOSPADM

## 2024-02-14 RX ORDER — LIDOCAINE HYDROCHLORIDE 20 MG/ML
INJECTION, SOLUTION EPIDURAL; INFILTRATION; INTRACAUDAL; PERINEURAL PRN
Status: DISCONTINUED | OUTPATIENT
Start: 2024-02-14 | End: 2024-02-14 | Stop reason: SDUPTHER

## 2024-02-14 RX ORDER — SODIUM CHLORIDE, SODIUM LACTATE, POTASSIUM CHLORIDE, CALCIUM CHLORIDE 600; 310; 30; 20 MG/100ML; MG/100ML; MG/100ML; MG/100ML
INJECTION, SOLUTION INTRAVENOUS CONTINUOUS
Status: DISCONTINUED | OUTPATIENT
Start: 2024-02-14 | End: 2024-02-14 | Stop reason: HOSPADM

## 2024-02-14 RX ORDER — MIDAZOLAM HYDROCHLORIDE 2 MG/2ML
2 INJECTION, SOLUTION INTRAMUSCULAR; INTRAVENOUS
Status: COMPLETED | OUTPATIENT
Start: 2024-02-14 | End: 2024-02-14

## 2024-02-14 RX ORDER — HALOPERIDOL 5 MG/ML
1 INJECTION INTRAMUSCULAR
Status: DISCONTINUED | OUTPATIENT
Start: 2024-02-14 | End: 2024-02-14 | Stop reason: HOSPADM

## 2024-02-14 RX ADMIN — KETAMINE HYDROCHLORIDE 10 MG: 50 INJECTION, SOLUTION INTRAMUSCULAR; INTRAVENOUS at 14:50

## 2024-02-14 RX ADMIN — SODIUM CHLORIDE, POTASSIUM CHLORIDE, SODIUM LACTATE AND CALCIUM CHLORIDE: 600; 310; 30; 20 INJECTION, SOLUTION INTRAVENOUS at 10:49

## 2024-02-14 RX ADMIN — KETAMINE HYDROCHLORIDE 20 MG: 50 INJECTION, SOLUTION INTRAMUSCULAR; INTRAVENOUS at 13:51

## 2024-02-14 RX ADMIN — HYDROMORPHONE HYDROCHLORIDE 0.5 MG: 2 INJECTION INTRAMUSCULAR; INTRAVENOUS; SUBCUTANEOUS at 14:58

## 2024-02-14 RX ADMIN — Medication 3 MG: at 15:13

## 2024-02-14 RX ADMIN — MIDAZOLAM HYDROCHLORIDE 2 MG: 1 INJECTION, SOLUTION INTRAMUSCULAR; INTRAVENOUS at 13:42

## 2024-02-14 RX ADMIN — ONDANSETRON 4 MG: 2 INJECTION INTRAMUSCULAR; INTRAVENOUS at 14:16

## 2024-02-14 RX ADMIN — HYDROMORPHONE HYDROCHLORIDE 0.5 MG: 2 INJECTION INTRAMUSCULAR; INTRAVENOUS; SUBCUTANEOUS at 14:27

## 2024-02-14 RX ADMIN — HYDROMORPHONE HYDROCHLORIDE 0.4 MG: 2 INJECTION INTRAMUSCULAR; INTRAVENOUS; SUBCUTANEOUS at 15:39

## 2024-02-14 RX ADMIN — PROPOFOL 180 MG: 10 INJECTION, EMULSION INTRAVENOUS at 13:51

## 2024-02-14 RX ADMIN — ROCURONIUM BROMIDE 50 MG: 50 INJECTION, SOLUTION INTRAVENOUS at 13:51

## 2024-02-14 RX ADMIN — FENTANYL CITRATE 100 MCG: 50 INJECTION INTRAMUSCULAR; INTRAVENOUS at 13:51

## 2024-02-14 RX ADMIN — DEXAMETHASONE SODIUM PHOSPHATE 4 MG: 4 INJECTION INTRA-ARTICULAR; INTRALESIONAL; INTRAMUSCULAR; INTRAVENOUS; SOFT TISSUE at 14:16

## 2024-02-14 RX ADMIN — MIDAZOLAM 1 MG: 1 INJECTION INTRAMUSCULAR; INTRAVENOUS at 15:56

## 2024-02-14 RX ADMIN — LIDOCAINE HYDROCHLORIDE 75 MG: 20 INJECTION, SOLUTION EPIDURAL; INFILTRATION; INTRACAUDAL; PERINEURAL at 13:51

## 2024-02-14 RX ADMIN — ROCURONIUM BROMIDE 15 MG: 50 INJECTION, SOLUTION INTRAVENOUS at 14:39

## 2024-02-14 RX ADMIN — OXYCODONE HYDROCHLORIDE 5 MG: 5 TABLET ORAL at 16:15

## 2024-02-14 RX ADMIN — HYDROMORPHONE HYDROCHLORIDE 0.4 MG: 2 INJECTION INTRAMUSCULAR; INTRAVENOUS; SUBCUTANEOUS at 15:36

## 2024-02-14 RX ADMIN — HYDROMORPHONE HYDROCHLORIDE 0.5 MG: 2 INJECTION, SOLUTION INTRAMUSCULAR; INTRAVENOUS; SUBCUTANEOUS at 15:51

## 2024-02-14 RX ADMIN — ACETAMINOPHEN 1000 MG: 500 TABLET, FILM COATED ORAL at 10:44

## 2024-02-14 RX ADMIN — HYDROMORPHONE HYDROCHLORIDE 0.2 MG: 2 INJECTION INTRAMUSCULAR; INTRAVENOUS; SUBCUTANEOUS at 15:43

## 2024-02-14 RX ADMIN — Medication 2000 MG: at 13:57

## 2024-02-14 RX ADMIN — GLYCOPYRROLATE 0.4 MG: 0.2 INJECTION INTRAMUSCULAR; INTRAVENOUS at 15:13

## 2024-02-14 NOTE — INTERVAL H&P NOTE
Update History & Physical    The Patient's History and Physical was reviewed   I discussed the surgery and patients medical condition with the patient.  The chart was reviewed with the patient and I examined the patient.    There was no change from previous note.  The surgical site was confirmed by the patient and me.    CV: RRR  RESP: CTAB    Plan:  The risk, benefits, expected outcome, and alternative to the recommended procedure have been discussed with the patient.  Patient understands and elects to proceed with the procedure as planned.    Electronically signed by Jurgen Best MD on 02/14/24 12:30 PM

## 2024-02-14 NOTE — DISCHARGE INSTRUCTIONS
POST-OP INSTRUCTIONS FOR HIP ARTHROSCOPY   (Dr. Best)    Day of Surgery:  DIET: Begin with liquids and light foods (jello, soup, etc). Progress to your normal diet if you are not nauseated.    MEDICATION:                                                          1.Pain medication: Norco (hydrocodone/acetaminophen) or Oxycodone. This is strong pain medication, use caution with walking, climbing stairs. Do not combine pain medication with alcohol.  If you are taking oxycodone, you may also take Tylenol (acetaminophen) 500mg tabs. 2 tabs every 8 hours. DO NOT take Tylenol (acetaminophen) if you are given Norco as this medicine already contains acetaminophen             -Slowly wean off the pain medication as the pain improves.                                                                  2.Stool Softener. Pain medication can cause constipation. Be sure to drink plenty of water and take an over the counter stool softener as needed.           3. Heterotopic Bone Prophylaxis: Indomethacin 1 tab x 3 days. You will then begin taking Mobic 1 tab daily for one month.  These medications can upset your stomach. Take them with food. If you experience GI discomfort or heartburn, add Prilosec OTC once daily for the month, and call the office.    ICE: Do NOT put ice machine directly on your skin. Use it frequently for the first 72 hours. You may also use a regular ice bag/pack, 20 minutes at a time. You may use the ice machine continuously if your skin is not irritated or burning from it    SHOWERING: No showering until post op day 3.  Leave bandages in place.    ACTIVITY: Use crutches with partial weightbearing, flat foot on the operative leg. This should only be the weight of your leg. DO NOT walk on your toes. Begin ankle pumps.    First & Second Post-OP Day:  Meds: continue as instructed above.  Bandages: No showering, keep bandage in place. You may experience liquid drainage from the hip onto the bandages which is

## 2024-02-14 NOTE — PERIOP NOTE
PACU DISCHARGE NOTE    Pt and family educated on discharge instructions. No additional questions at this time. Vital signs stable, pain well controlled, alert and oriented times three or at baseline, follow up per surgeon, no anesthetic complications.   Pt and family state they have crutches at home

## 2024-02-14 NOTE — ANESTHESIA PRE PROCEDURE
Department of Anesthesiology  Preprocedure Note       Name:  Jax Duncan   Age:  43 y.o.  :  1980                                          MRN:  [id number]         Date:  2024      Surgeon: Surgeon(s):  Jurgen Best MD    Procedure: Procedure(s):  RIGHT HIP ARTHROSCOPY LABRAL REPAIR, ACETABULOPLASTY AND FEMOROPLASTY    HANA BED    Medications prior to admission:   Prior to Admission medications    Medication Sig Start Date End Date Taking? Authorizing Provider   indomethacin (INDOCIN SR) 75 MG extended release capsule Take 1 capsule by mouth 2 times daily (with meals)   Yes ProviderMoira MD   meloxicam (MOBIC) 7.5 MG tablet Take 1 tablet by mouth daily as needed for Pain 24   Kayla Davidson PA   ondansetron (ZOFRAN) 4 MG tablet Take 1 tablet by mouth 3 times daily as needed for Nausea or Vomiting 24   Kayla Davidson PA   Cholecalciferol (VITAMIN D3) 1.25 MG (20177 UT) CAPS Take by mouth    Moira Ragsdale MD   Multiple Vitamins-Minerals (MULTIVITAMIN WOMEN PO) Take by mouth    Moira Ragsdale MD       Current medications:    Current Facility-Administered Medications   Medication Dose Route Frequency Provider Last Rate Last Admin   • sodium chloride flush 0.9 % injection 5-40 mL  5-40 mL IntraVENous 2 times per day Kayla Davidson PA       • sodium chloride flush 0.9 % injection 5-40 mL  5-40 mL IntraVENous PRN Kayla Davidson PA       • 0.9 % sodium chloride infusion   IntraVENous PRN Kayla Davidson PA       • 0.9 % sodium chloride infusion   IntraVENous Continuous Kayla Davidson PA       • ceFAZolin (ANCEF) 2000 mg in sterile water 20 mL IV syringe  2,000 mg IntraVENous On Call to OR Kayla Davidson PA       • lidocaine 1 % injection 1 mL  1 mL IntraDERmal Once PRN Michel Kilgore MD       • fentaNYL (SUBLIMAZE) injection 100 mcg  100 mcg IntraVENous Once PRN Michel Kilgore MD       • ondansetron (ZOFRAN) injection 4 mg  4 mg IntraVENous Once

## 2024-02-14 NOTE — OP NOTE
Operative Report    Patient: Jax Duncan MRN: [id number]  SSN: [id number]    YOB: 1980  Age: 43 y.o.  Sex: female       Date of Surgery: 2/14/2024     Preoperative Diagnosis: 1) right hip labral tear  2)right hip femoroacetabular impingement    Postoperative Diagnosis: Same    Surgeon(s) and Role:     * Jurgen Best MD - Primary    Assistant: Kayla Davidson PA-C   The complexity of the surgery requires the use of a first assistant for patient positioning, suture management, anchor placement, manipulation of the leg and assistance in closure.  JANAK Cerrato was this assistant and was there for the entirety of the case.     Anesthesia: General     Procedure: 1) right  Hip arthroscopy with labral repair  2) right Hip arthroscopy with Cam femoroplasty  3) right  hip arthroscopy with rim trimming acetabuloplasty       Estimated Blood Loss:  10 mL    TractionTime: 40 Minutes (postless)    Implants: Arthrex suture tape and push locks x 2           Specimens: * No specimens in log *        Drains: None                Complications: None    Counts: Sponge and needle counts were correct times two.        Indications: See H & P     Intraoperative Findings: Combined cam and pincer morphology and chondral labral separation and labral tearing most pronounced from 1-3 o'clock    Procedure in Detail: After informed consent was obtained surgical site was marked in the preoperative area by myself.  Patient brought the operating room placed supine the operating table and anesthesia was induced.  Postless pink pad system was assembled as well as well-padded traction boots on bilateral lower extremities were assembled.  All bony prominences were well padded.  Traction and countertraction were applied with the hip distraction device and fluoroscopy was taken to assure adequate distraction of the operative hip joint.  right hip was then prepped and draped in usual orthopedic sterile fashion.

## 2024-02-14 NOTE — ANESTHESIA POSTPROCEDURE EVALUATION
Department of Anesthesiology  Postprocedure Note    Patient: Jax Duncan  MRN: [id number]  YOB: 1980  Date of evaluation: 2/14/2024    Procedure Summary       Date: 02/14/24 Room / Location: [facility] OP OR 06 / [facility]    Anesthesia Start: 1346 Anesthesia Stop: 1545    Procedure: RIGHT HIP ARTHROSCOPY LABRAL REPAIR, ACETABULOPLASTY AND FEMOROPLASTY (Right: Hip) Diagnosis:       Right hip pain      Femoral acetabular impingement      Tear of acetabular labrum, right, initial encounter      (Right hip pain [M25.551])      (Femoral acetabular impingement [M25.859])      (Tear of acetabular labrum, right, initial encounter [S73.191A])    Surgeons: Jurgen Best MD Responsible Provider: Michel Kilgore MD    Anesthesia Type: general ASA Status: 2            Anesthesia Type: No value filed.    Ejnnifer Phase I: Jennifer Score: 8    Jennifer Phase II: Jennifer Score: 10    Anesthesia Post Evaluation    Patient location during evaluation: PACU  Patient participation: complete - patient participated  Level of consciousness: awake and alert  Pain score: 1  Airway patency: patent  Nausea & Vomiting: no nausea  Cardiovascular status: blood pressure returned to baseline and hemodynamically stable  Respiratory status: acceptable  Hydration status: euvolemic  Multimodal analgesia pain management approach  Pain management: adequate and satisfactory to patient    No notable events documented.

## 2024-02-15 ENCOUNTER — HOSPITAL ENCOUNTER (OUTPATIENT)
Dept: PHYSICAL THERAPY | Age: 44
Setting detail: RECURRING SERIES
Discharge: HOME | End: 2024-02-18
Attending: ORTHOPAEDIC SURGERY
Payer: COMMERCIAL

## 2024-02-15 PROCEDURE — 97110 THERAPEUTIC EXERCISES: CPT

## 2024-02-15 PROCEDURE — 97140 MANUAL THERAPY 1/> REGIONS: CPT

## 2024-02-15 NOTE — PROGRESS NOTES
Jax Duncan  : 1980  Primary: Umr (Commercial)  Secondary:  [facility] @ [place] [address]  Phone: [phone]  Fax: [phone] Plan Frequency: 1-2 times/week    Plan of Care/Certification Expiration Date: 24        Plan of Care/Certification Expiration Date:  Plan of Care/Certification Expiration Date: 24    Frequency/Duration:   Plan Frequency: 1-2 times/week      Time In/Out:   Time In: 0430  Time Out: 0455      PT Visit Info:    Plan Frequency: 1-2 times/week      Visit Count:  6    OUTPATIENT PHYSICAL THERAPY:   Treatment Note and Progress Note 2/15/2024       Episode  (R hip pain)               Treatment Diagnosis:    Hip pain, right  Left hip pain  Muscle weakness (generalized)  Stiffness of hip joint, right  Stiffness of hip joint, left    Goals: (Goals have been discussed and agreed upon with patient.)  Short-Term Functional Goals: Time Frame: 8 weeks  Pt to report compliance with HEP  Pt to report allen to sitting with less pain, movement changes  Pt to allen R hip flexibility without guarding/pain  Pt to amb WNL level surfaces without AD  Discharge Goals: Time Frame: 20 weeks  Pt to increase strength for sit to stand x 30 reps without hip pain  Pt to increase strength for reciprocal gait on stairs with controlled descent  Pt to allen shopping without R hip pain limiting her    Medical/Referring Diagnosis:    Right hip pain [M25.551]    Referring Physician:  Jurgen Best MD MD Orders:  PT Eval and Treat   Return MD Appt:   surgery   Date of Onset:  Onset Date: 07/15/23     Allergies:   Escitalopram and Hydrocodone-acetaminophen  Restrictions/Precautions:   Hip arthroscopy with labral repair, cam femoroplasty, rim trimming acetabuloplasty R      Interventions Planned (Treatment may consist of any combination of the following):     Functional Mobility Training, Gait Training, Home Exercise Program (HEP), Manual Therapy, Pain

## 2024-02-19 ENCOUNTER — PATIENT MESSAGE (OUTPATIENT)
Dept: ORTHOPEDIC SURGERY | Age: 44
End: 2024-02-19

## 2024-02-19 ENCOUNTER — HOSPITAL ENCOUNTER (OUTPATIENT)
Dept: PHYSICAL THERAPY | Age: 44
Setting detail: RECURRING SERIES
Discharge: HOME | End: 2024-02-22
Attending: ORTHOPAEDIC SURGERY
Payer: COMMERCIAL

## 2024-02-19 DIAGNOSIS — Z09 STATUS POST ORTHOPEDIC SURGERY, FOLLOW-UP EXAM: Primary | ICD-10-CM

## 2024-02-19 PROCEDURE — 97140 MANUAL THERAPY 1/> REGIONS: CPT

## 2024-02-19 PROCEDURE — 97110 THERAPEUTIC EXERCISES: CPT

## 2024-02-19 RX ORDER — OXYCODONE HYDROCHLORIDE 5 MG/1
5 TABLET ORAL EVERY 4 HOURS PRN
Qty: 30 TABLET | Refills: 0 | Status: SHIPPED | OUTPATIENT
Start: 2024-02-19 | End: 2024-02-20 | Stop reason: RX

## 2024-02-19 NOTE — PROGRESS NOTES
Jax Duncan  : 1980  Primary: Umr (Commercial)  Secondary:  [facility] @ [place] [address]  Phone: [phone]  Fax: [phone] Plan Frequency: 1-2 times/week    Plan of Care/Certification Expiration Date: 24        Plan of Care/Certification Expiration Date:  Plan of Care/Certification Expiration Date: 24    Frequency/Duration:   Plan Frequency: 1-2 times/week      Time In/Out:   Time In: 1020  Time Out: 1100      PT Visit Info:    Plan Frequency: 1-2 times/week  Total # of Visits to Date: 7         OUTPATIENT PHYSICAL THERAPY:   Treatment Note 2024       Episode  (R hip pain)               Treatment Diagnosis:    Hip pain, right  Left hip pain  Muscle weakness (generalized)  Stiffness of hip joint, right  Stiffness of hip joint, left    Goals: (Goals have been discussed and agreed upon with patient.)  Short-Term Functional Goals: Time Frame: 8 weeks  Pt to report compliance with HEP  Pt to report allen to sitting with less pain, movement changes  Pt to allen R hip flexibility without guarding/pain  Pt to amb WNL level surfaces without AD  Discharge Goals: Time Frame: 20 weeks  Pt to increase strength for sit to stand x 30 reps without hip pain  Pt to increase strength for reciprocal gait on stairs with controlled descent  Pt to allen shopping without R hip pain limiting her    Medical/Referring Diagnosis:    Right hip pain [M25.551]    Referring Physician:  Jurgen Best MD MD Orders:  PT Eval and Treat   Return MD Appt:   surgery   Date of Onset:  Onset Date: 07/15/23     Allergies:   Escitalopram and Hydrocodone-acetaminophen  Restrictions/Precautions:   Hip arthroscopy with labral repair, cam femoroplasty, rim trimming acetabuloplasty R      Interventions Planned (Treatment may consist of any combination of the following):     Functional Mobility Training, Gait Training, Home Exercise Program (HEP), Manual Therapy, Pain

## 2024-02-20 ENCOUNTER — APPOINTMENT (OUTPATIENT)
Dept: PHYSICAL THERAPY | Age: 44
End: 2024-02-20
Attending: ORTHOPAEDIC SURGERY
Payer: COMMERCIAL

## 2024-02-20 DIAGNOSIS — Z09 STATUS POST ORTHOPEDIC SURGERY, FOLLOW-UP EXAM: ICD-10-CM

## 2024-02-20 RX ORDER — OXYCODONE HYDROCHLORIDE 5 MG/1
5 TABLET ORAL EVERY 6 HOURS PRN
Qty: 20 TABLET | Refills: 0 | Status: SHIPPED | OUTPATIENT
Start: 2024-02-20 | End: 2024-02-25

## 2024-02-20 NOTE — PROGRESS NOTES
Pain medicine not approved from yesterday due to the amount of pills given.  Prescription resent for oxycodone 5 mg every 6 hours for 5 days.  20 pills sent to the pharmacy today.

## 2024-02-22 ENCOUNTER — HOSPITAL ENCOUNTER (OUTPATIENT)
Dept: PHYSICAL THERAPY | Age: 44
Setting detail: RECURRING SERIES
Discharge: HOME | End: 2024-02-25
Attending: ORTHOPAEDIC SURGERY
Payer: COMMERCIAL

## 2024-02-22 PROCEDURE — 97110 THERAPEUTIC EXERCISES: CPT

## 2024-02-22 PROCEDURE — 97140 MANUAL THERAPY 1/> REGIONS: CPT

## 2024-02-22 NOTE — PROGRESS NOTES
8:00 AM Inez Jalloh, PT SFO[facility] SFO   3/25/2024  4:00 PM Inez Jalloh, PT S[facility] SFO   3/26/2024  8:20 AM Jurgen Best MD [facility] AMB   3/28/2024  8:00 AM Inez Jalloh, PT [facility] SFO   9/13/2024 11:30 AM [facility]       no

## 2024-02-26 ENCOUNTER — HOSPITAL ENCOUNTER (OUTPATIENT)
Dept: PHYSICAL THERAPY | Age: 44
Setting detail: RECURRING SERIES
Discharge: HOME | End: 2024-02-29
Attending: ORTHOPAEDIC SURGERY
Payer: COMMERCIAL

## 2024-02-26 PROCEDURE — 97140 MANUAL THERAPY 1/> REGIONS: CPT

## 2024-02-26 NOTE — PROGRESS NOTES
Name: Jax Duncan  YOB: 1980  Gender: female  MRN: [id number]    Procedure Performed:   1) right  Hip arthroscopy with labral repair  2) right Hip arthroscopy with Cam femoroplasty  3) right  hip arthroscopy with rim trimming acetabuloplasty     Date of Procedure: 2/14/24       Subjective: Returns 2 weeks status post the above procedure.     Physical Examination:   Incisions clean dry and intact, no sign of infection. Motor intact.  She has diminished sensation distally but it is intact.  Dorsalis pedis pulse 2+.  Calf is soft nontender palpation.  Dorsi and plantarflexion mechanism intact.    Assessment:   1. Status post orthopedic surgery, follow-up exam         Plan:   Doing well.   Continue PT per right hip arthroscopy labral repair protocol. Katie at [facility] is treating her - I discussed protocol with Katie today.   Follow up in 4 weeks.

## 2024-02-26 NOTE — PROGRESS NOTES
Jax Duncan  : 1980  Primary: Umr (Commercial)  Secondary:  [facility] @ [place] [address]  Phone: [phone]  Fax: [phone] Plan Frequency: 1-2 times/week    Plan of Care/Certification Expiration Date: 24        Plan of Care/Certification Expiration Date:  Plan of Care/Certification Expiration Date: 24    Frequency/Duration:   Plan Frequency: 1-2 times/week      Time In/Out:   Time In: 1020  Time Out: 1100      PT Visit Info:    Plan Frequency: 1-2 times/week  Total # of Visits to Date: 7         OUTPATIENT PHYSICAL THERAPY:   Treatment Note 2024       Episode  (R hip pain)               Treatment Diagnosis:    Hip pain, right  Left hip pain  Muscle weakness (generalized)  Stiffness of hip joint, right  Stiffness of hip joint, left    Goals: (Goals have been discussed and agreed upon with patient.)  Short-Term Functional Goals: Time Frame: 8 weeks  Pt to report compliance with HEP  Pt to report allen to sitting with less pain, movement changes  Pt to allen R hip flexibility without guarding/pain  Pt to amb WNL level surfaces without AD  Discharge Goals: Time Frame: 20 weeks  Pt to increase strength for sit to stand x 30 reps without hip pain  Pt to increase strength for reciprocal gait on stairs with controlled descent  Pt to allen shopping without R hip pain limiting her    Medical/Referring Diagnosis:    Right hip pain [M25.551]    Referring Physician:  Jurgen Best MD MD Orders:  PT Eval and Treat   Return MD Appt:   surgery   Date of Onset:  Onset Date: 07/15/23     Allergies:   Escitalopram and Hydrocodone-acetaminophen  Restrictions/Precautions:   Hip arthroscopy with labral repair, cam femoroplasty, rim trimming acetabuloplasty R      Interventions Planned (Treatment may consist of any combination of the following):     Functional Mobility Training, Gait Training, Home Exercise Program (HEP), Manual Therapy, Pain

## 2024-02-27 ENCOUNTER — OFFICE VISIT (OUTPATIENT)
Dept: ORTHOPEDIC SURGERY | Age: 44
End: 2024-02-27

## 2024-02-27 ENCOUNTER — APPOINTMENT (OUTPATIENT)
Dept: PHYSICAL THERAPY | Age: 44
End: 2024-02-27
Attending: ORTHOPAEDIC SURGERY
Payer: COMMERCIAL

## 2024-02-27 DIAGNOSIS — Z09 STATUS POST ORTHOPEDIC SURGERY, FOLLOW-UP EXAM: Primary | ICD-10-CM

## 2024-02-27 PROCEDURE — 99024 POSTOP FOLLOW-UP VISIT: CPT | Performed by: PHYSICIAN ASSISTANT

## 2024-02-27 RX ORDER — GABAPENTIN 300 MG/1
300 CAPSULE ORAL 3 TIMES DAILY
Qty: 90 CAPSULE | Refills: 1 | Status: SHIPPED | OUTPATIENT
Start: 2024-02-27 | End: 2024-04-27

## 2024-02-29 ENCOUNTER — HOSPITAL ENCOUNTER (OUTPATIENT)
Dept: PHYSICAL THERAPY | Age: 44
Setting detail: RECURRING SERIES
End: 2024-02-29
Attending: ORTHOPAEDIC SURGERY
Payer: COMMERCIAL

## 2024-02-29 PROCEDURE — 97140 MANUAL THERAPY 1/> REGIONS: CPT

## 2024-02-29 PROCEDURE — 97110 THERAPEUTIC EXERCISES: CPT

## 2024-02-29 NOTE — PROGRESS NOTES
Jax Duncan  : 1980  Primary: Umr (Commercial)  Secondary:  [facility] @ [place] [address]  Phone: [phone]  Fax: [phone] Plan Frequency: 1-2 times/week    Plan of Care/Certification Expiration Date: 24        Plan of Care/Certification Expiration Date:  Plan of Care/Certification Expiration Date: 24    Frequency/Duration:   Plan Frequency: 1-2 times/week      Time In/Out:   Time In: 805  Time Out: 855      PT Visit Info:    Plan Frequency: 1-2 times/week  Total # of Visits to Date: 10         OUTPATIENT PHYSICAL THERAPY:   Treatment Note 2024       Episode  (R hip pain)               Treatment Diagnosis:    Hip pain, right  Left hip pain  Muscle weakness (generalized)  Stiffness of hip joint, right  Stiffness of hip joint, left    Goals: (Goals have been discussed and agreed upon with patient.)  Short-Term Functional Goals: Time Frame: 8 weeks  Pt to report compliance with HEP  Pt to report allen to sitting with less pain, movement changes  Pt to allen R hip flexibility without guarding/pain  Pt to amb WNL level surfaces without AD  Discharge Goals: Time Frame: 20 weeks  Pt to increase strength for sit to stand x 30 reps without hip pain  Pt to increase strength for reciprocal gait on stairs with controlled descent  Pt to allen shopping without R hip pain limiting her    Medical/Referring Diagnosis:    Right hip pain [M25.551]    Referring Physician:  Jurgen Best MD MD Orders:  PT Eval and Treat   Return MD Appt:   surgery   Date of Onset:  Onset Date: 07/15/23     Allergies:   Escitalopram and Hydrocodone-acetaminophen  Restrictions/Precautions:   Hip arthroscopy with labral repair, cam femoroplasty, rim trimming acetabuloplasty R      Interventions Planned (Treatment may consist of any combination of the following):     Functional Mobility Training, Gait Training, Home Exercise Program (HEP), Manual Therapy, Pain

## 2024-03-04 ENCOUNTER — HOSPITAL ENCOUNTER (OUTPATIENT)
Dept: PHYSICAL THERAPY | Age: 44
Setting detail: RECURRING SERIES
Discharge: HOME | End: 2024-03-07
Attending: ORTHOPAEDIC SURGERY
Payer: COMMERCIAL

## 2024-03-04 PROCEDURE — 97140 MANUAL THERAPY 1/> REGIONS: CPT

## 2024-03-04 PROCEDURE — 97110 THERAPEUTIC EXERCISES: CPT

## 2024-03-04 NOTE — PROGRESS NOTES
Jurgen Best MD [facility] AMB   3/28/2024  8:00 AM Inez Jalloh PT SFO[facility] O   9/13/2024 11:30 AM Daniel Cueto MD [facility] AMB

## 2024-03-07 ENCOUNTER — HOSPITAL ENCOUNTER (OUTPATIENT)
Dept: PHYSICAL THERAPY | Age: 44
Setting detail: RECURRING SERIES
Discharge: HOME | End: 2024-03-10
Attending: ORTHOPAEDIC SURGERY
Payer: COMMERCIAL

## 2024-03-07 PROCEDURE — 97140 MANUAL THERAPY 1/> REGIONS: CPT

## 2024-03-07 PROCEDURE — 97110 THERAPEUTIC EXERCISES: CPT

## 2024-03-07 NOTE — PROGRESS NOTES
3/28/2024  8:00 AM Inez Jalloh PT [facility] SFO   9/13/2024 11:30 AM Daniel Cueto MD [facility] AMB

## 2024-03-11 ENCOUNTER — HOSPITAL ENCOUNTER (OUTPATIENT)
Dept: PHYSICAL THERAPY | Age: 44
Setting detail: RECURRING SERIES
Discharge: HOME | End: 2024-03-14
Attending: ORTHOPAEDIC SURGERY
Payer: COMMERCIAL

## 2024-03-11 PROCEDURE — 97140 MANUAL THERAPY 1/> REGIONS: CPT

## 2024-03-11 PROCEDURE — 97110 THERAPEUTIC EXERCISES: CPT

## 2024-03-11 NOTE — PROGRESS NOTES
Jax Duncan  : 1980  Primary: Umr (Commercial)  Secondary:  [facility] @ [place] [address]  Phone: [phone]  Fax: [phone] Plan Frequency: 1-2 times/week    Plan of Care/Certification Expiration Date: 24        Plan of Care/Certification Expiration Date:  Plan of Care/Certification Expiration Date: 24    Frequency/Duration:   Plan Frequency: 1-2 times/week      Time In/Out:   Time In: 1022  Time Out: 1110      PT Visit Info:    Plan Frequency: 1-2 times/week  Total # of Visits to Date: 12         OUTPATIENT PHYSICAL THERAPY:   Treatment Note 3/11/2024       Episode  (R hip pain)               Treatment Diagnosis:    Hip pain, right  Left hip pain  Muscle weakness (generalized)  Stiffness of hip joint, right  Stiffness of hip joint, left    Goals: (Goals have been discussed and agreed upon with patient.)  Short-Term Functional Goals: Time Frame: 8 weeks  Pt to report compliance with HEP  Pt to report allen to sitting with less pain, movement changes  Pt to allen R hip flexibility without guarding/pain  Pt to amb WNL level surfaces without AD  Discharge Goals: Time Frame: 20 weeks  Pt to increase strength for sit to stand x 30 reps without hip pain  Pt to increase strength for reciprocal gait on stairs with controlled descent  Pt to allen shopping without R hip pain limiting her    Medical/Referring Diagnosis:    Right hip pain [M25.551]    Referring Physician:  Jurgen Best MD MD Orders:  PT Eval and Treat   Return MD Appt:   surgery   Date of Onset:  Onset Date: 07/15/23     Allergies:   Escitalopram and Hydrocodone-acetaminophen  Restrictions/Precautions:   Hip arthroscopy with labral repair, cam femoroplasty, rim trimming acetabuloplasty R      Interventions Planned (Treatment may consist of any combination of the following):     Functional Mobility Training, Gait Training, Home Exercise Program (HEP), Manual Therapy, Pain

## 2024-03-14 ENCOUNTER — HOSPITAL ENCOUNTER (OUTPATIENT)
Dept: PHYSICAL THERAPY | Age: 44
Setting detail: RECURRING SERIES
Discharge: HOME | End: 2024-03-17
Attending: ORTHOPAEDIC SURGERY
Payer: COMMERCIAL

## 2024-03-14 PROCEDURE — 97140 MANUAL THERAPY 1/> REGIONS: CPT

## 2024-03-14 PROCEDURE — 97110 THERAPEUTIC EXERCISES: CPT

## 2024-03-14 NOTE — PROGRESS NOTES
PM Inez Jalloh, PT S[facility] SFO   4/3/2024  4:00 PM StellaInez johnson, PT S[facility] SFO   4/8/2024  4:00 PM StellaInez johnson, PT S[facility] SFO   4/10/2024  4:00 PM ShubhamInez johnson, PT S[facility] SFO   4/15/2024  4:00 PM Inez Jalloh, PT S[facility] SFO   4/17/2024  4:00 PM Inez Jalloh, PT S[facility] SFO   4/22/2024  4:00 PM Inez Jalloh, PT [facility] SFO   4/24/2024  4:00 PM Inez Jalloh, PT [facility] SFO   4/29/2024  4:00 PM Inez Jalloh, PT [facility] SFO   5/1/2024  4:00 PM Inez Jalloh, PT [facility] SFO   9/13/2024 11:30 AM Daniel Cueto MD [facility] AMB

## 2024-03-15 ENCOUNTER — TELEPHONE (OUTPATIENT)
Dept: FAMILY MEDICINE CLINIC | Facility: CLINIC | Age: 44
End: 2024-03-15

## 2024-03-15 DIAGNOSIS — Z00.00 WELL ADULT HEALTH CHECK: ICD-10-CM

## 2024-03-15 DIAGNOSIS — Z13.220 LIPID SCREENING: ICD-10-CM

## 2024-03-15 DIAGNOSIS — E55.9 VITAMIN D DEFICIENCY: Primary | ICD-10-CM

## 2024-03-15 DIAGNOSIS — Z13.1 DIABETES MELLITUS SCREENING: ICD-10-CM

## 2024-03-18 ENCOUNTER — HOSPITAL ENCOUNTER (OUTPATIENT)
Dept: PHYSICAL THERAPY | Age: 44
Setting detail: RECURRING SERIES
Discharge: HOME | End: 2024-03-21
Attending: ORTHOPAEDIC SURGERY
Payer: COMMERCIAL

## 2024-03-18 PROCEDURE — 97140 MANUAL THERAPY 1/> REGIONS: CPT

## 2024-03-18 PROCEDURE — 97110 THERAPEUTIC EXERCISES: CPT

## 2024-03-18 NOTE — PROGRESS NOTES
Inez, PT SFO[facility] SFO   4/15/2024  4:00 PM Inez Jalloh, PT S[facility] SFO   4/17/2024  4:00 PM Inez Jalloh, PT S[facility] SFO   4/22/2024  4:00 PM Inez Jalloh, PT S[facility] SFO   4/24/2024  4:00 PM Inez Jalloh, PT [facility] SFO   4/29/2024  4:00 PM Inez Jalloh, PT [facility] SFO   5/1/2024  4:00 PM Inez Jalloh, PT [facility] SFO   6/4/2024  8:45 AM [facility]PA [facility] AMB   6/10/2024  1:30 PM Daniel Cueto MD [facility] AMB

## 2024-03-21 ENCOUNTER — HOSPITAL ENCOUNTER (OUTPATIENT)
Dept: PHYSICAL THERAPY | Age: 44
Setting detail: RECURRING SERIES
Discharge: HOME | End: 2024-03-24
Attending: ORTHOPAEDIC SURGERY
Payer: COMMERCIAL

## 2024-03-21 PROCEDURE — 97140 MANUAL THERAPY 1/> REGIONS: CPT

## 2024-03-21 PROCEDURE — 97110 THERAPEUTIC EXERCISES: CPT

## 2024-03-21 NOTE — PROGRESS NOTES
Jax Duncan  : 1980  Primary: Umr (Commercial)  Secondary:  [facility] @ [place] [address]  Phone: [phone]  Fax: [phone] Plan Frequency: 1-2 times/week    Plan of Care/Certification Expiration Date: 24        Plan of Care/Certification Expiration Date:  Plan of Care/Certification Expiration Date: 24    Frequency/Duration:   Plan Frequency: 1-2 times/week      Time In/Out:   Time In: 0804  Time Out: 0850      PT Visit Info:    Plan Frequency: 1-2 times/week  Total # of Visits to Date: 12         OUTPATIENT PHYSICAL THERAPY:   Treatment Note  3/21/2024       Episode  (R hip pain)               Treatment Diagnosis:    Hip pain, right  Left hip pain  Muscle weakness (generalized)  Stiffness of hip joint, right  Stiffness of hip joint, left    Goals: (Goals have been discussed and agreed upon with patient.)  Short-Term Functional Goals: Time Frame: 8 weeks  Pt to report compliance with HEP - MET  Pt to report allen to sitting with less pain, movement changes - MET  Pt to allen R hip flexibility without guarding/pain - ongoing  Pt to amb WNL level surfaces without AD - ongoing  Discharge Goals: Time Frame: 20 weeks  Pt to increase strength for sit to stand x 30 reps without hip pain - ongoing  Pt to increase strength for reciprocal gait on stairs with controlled descent - ongoing  Pt to allen shopping without R hip pain limiting her - ongoing    Medical/Referring Diagnosis:    Right hip pain [M25.551]    Referring Physician:  Jurgen Best MD MD Orders:  PT Eval and Treat   Return MD Appt:   surgery   Date of Onset:  Onset Date: 07/15/23     Allergies:   Escitalopram and Hydrocodone-acetaminophen  Restrictions/Precautions:   Hip arthroscopy with labral repair, cam femoroplasty, rim trimming acetabuloplasty R      Interventions Planned (Treatment may consist of any combination of the following):     Functional Mobility Training, Gait

## 2024-03-25 ENCOUNTER — HOSPITAL ENCOUNTER (OUTPATIENT)
Dept: PHYSICAL THERAPY | Age: 44
Setting detail: RECURRING SERIES
Discharge: HOME | End: 2024-03-28
Attending: ORTHOPAEDIC SURGERY
Payer: COMMERCIAL

## 2024-03-25 PROCEDURE — 97140 MANUAL THERAPY 1/> REGIONS: CPT

## 2024-03-25 PROCEDURE — 97110 THERAPEUTIC EXERCISES: CPT

## 2024-03-25 NOTE — PROGRESS NOTES
SFO[facility] SFO   4/3/2024  4:00 PM Inez Jalloh, PT [facility] SFO   4/8/2024  4:00 PM Inez Jalloh, PT S[facility] SFO   4/9/2024  9:30 AM Jurgen Best MD [facility] AMB   4/10/2024  4:00 PM Inez Jalloh, PT S[facility] SFO   4/15/2024  4:00 PM Inez Jalloh, PT S[facility] SFO   4/17/2024  4:00 PM Inez Jalloh, PT S[facility] SFO   4/22/2024  4:00 PM Inez Jalloh, PT S[facility] SFO   4/24/2024  4:00 PM Inez Jalloh, PT S[facility] SFO   4/29/2024  4:00 PM Inez Jalloh, PT [facility] SFO   5/1/2024  4:00 PM Inez Jalloh, PT S[facility] SFO   6/4/2024  8:45 AM [facility]PA MISCELLANEOUS [facility] AMB   6/10/2024  1:30 PM Daniel Cueto MD [facility] AMB

## 2024-03-26 ENCOUNTER — OFFICE VISIT (OUTPATIENT)
Dept: ORTHOPEDIC SURGERY | Age: 44
End: 2024-03-26

## 2024-03-26 DIAGNOSIS — Z09 STATUS POST ORTHOPEDIC SURGERY, FOLLOW-UP EXAM: Primary | ICD-10-CM

## 2024-03-26 PROCEDURE — 99024 POSTOP FOLLOW-UP VISIT: CPT | Performed by: ORTHOPAEDIC SURGERY

## 2024-03-26 NOTE — PROGRESS NOTES
Name: Jax Duncan  YOB: 1980  Gender: female  MRN: [id number]    Procedure Performed:   1) right  Hip arthroscopy with labral repair  2) right Hip arthroscopy with Cam femoroplasty  3) right  hip arthroscopy with rim trimming acetabuloplasty    Date of Procedure: 02/14/2024    Subjective: Returns 6 weeks status post the above procedure.  She states that she does have ups and downs with her pain levels.  She states overall she is having more good days than bad days.  She still has trouble sleeping but does find relief with the Benadryl.  She is continuing to go to physical therapy with Caryl at [place].  She is no longer taking thing for pain at this time.  She still notes numbness in her right foot.     Physical Examination: Incisions clean dry and intact, no sign of infection. Motor intact.  Intact but diminished sensation distally  Calf is soft nontender to palpation.  Dorsi and plantarflexion mechanism intact.  She can perform a straight leg raise without pain.  She does not have pain with hip flexion today.    Assessment:   1. Status post orthopedic surgery, follow-up exam       Plan:   Doing well.    Continue PT per right hip arthroscopy labral repair protocol   Follow up in 8 weeks    Kayla Davidson PA-C dictating as a scribe for Dr. Best.

## 2024-03-28 ENCOUNTER — HOSPITAL ENCOUNTER (OUTPATIENT)
Dept: PHYSICAL THERAPY | Age: 44
Setting detail: RECURRING SERIES
Discharge: HOME | End: 2024-03-31
Attending: ORTHOPAEDIC SURGERY
Payer: COMMERCIAL

## 2024-03-28 PROCEDURE — 97113 AQUATIC THERAPY/EXERCISES: CPT

## 2024-03-28 NOTE — PROGRESS NOTES
Jax Duncan  : 1980  Primary: Umr (Commercial)  Secondary:  [facility] @ [place] [address]  Phone: [phone]  Fax: [phone] Plan Frequency: 1-2 times/week    Plan of Care/Certification Expiration Date: 24        Plan of Care/Certification Expiration Date:  Plan of Care/Certification Expiration Date: 24    Frequency/Duration:   Plan Frequency: 1-2 times/week      Time In/Out:   Time In: 803  Time Out: 920      PT Visit Info:    Plan Frequency: 1-2 times/week  Total # of Visits to Date: 17         OUTPATIENT PHYSICAL THERAPY:   Treatment Note  3/28/2024       Episode  (R hip pain)               Treatment Diagnosis:    Hip pain, right  Left hip pain  Muscle weakness (generalized)  Stiffness of hip joint, right  Stiffness of hip joint, left    Goals: (Goals have been discussed and agreed upon with patient.)  Short-Term Functional Goals: Time Frame: 8 weeks  Pt to report compliance with HEP - MET  Pt to report allen to sitting with less pain, movement changes - MET  Pt to allen R hip flexibility without guarding/pain - ongoing  Pt to amb WNL level surfaces without AD - ongoing  Discharge Goals: Time Frame: 20 weeks  Pt to increase strength for sit to stand x 30 reps without hip pain - ongoing  Pt to increase strength for reciprocal gait on stairs with controlled descent - ongoing  Pt to allen shopping without R hip pain limiting her - ongoing    Medical/Referring Diagnosis:    Right hip pain [M25.551]    Referring Physician:  Jurgen Best MD MD Orders:  PT Eval and Treat   Return MD Appt:   surgery   Date of Onset:  Onset Date: 07/15/23     Allergies:   Escitalopram and Hydrocodone-acetaminophen  Restrictions/Precautions:   Hip arthroscopy with labral repair, cam femoroplasty, rim trimming acetabuloplasty R      Interventions Planned (Treatment may consist of any combination of the following):     Functional Mobility Training, Gait

## 2024-04-01 ENCOUNTER — HOSPITAL ENCOUNTER (OUTPATIENT)
Dept: PHYSICAL THERAPY | Age: 44
Setting detail: RECURRING SERIES
Discharge: HOME | End: 2024-04-04
Attending: ORTHOPAEDIC SURGERY
Payer: COMMERCIAL

## 2024-04-01 PROCEDURE — 97113 AQUATIC THERAPY/EXERCISES: CPT

## 2024-04-01 NOTE — PROGRESS NOTES
Training, Home Exercise Program (HEP), Manual Therapy, Pain Management, Range of Motion (ROM), Therapeutic Exercise/Strengthening, and Aquatic Therapy     Subjective Comments:   I was more active this weekend.  I am sore in both gluts and in the groin.  I am dipping when I walk and I can feel it.  Initial Pain Level:: 0/10  Post Session Pain Level:  0/10  Medications Last Reviewed:  4/1/2024  Updated Objective Findings:  3/18/24    Observation, Palpation, and Special Tests   Marked tenderness R hip add, flex, quad, post-lat hip      ROM   Deferred due to precautions/inability to allen AROM      Functional Mobility, Balance, and Gait   Stairs - step-to pattern    SLS - > 30 sec L, 0 R   Sit to stand - rigid trunk, UE assist, weight on L LE   Gait - amb with straight cane     Outcome Measure:   HOS:  45  1/25/24               3  2/15/24             23  3/18/24  Treatment   THERAPEUTIC ACTIVITY: ( see below for minutes):  Therapeutic activities per grid below to improve mobility.  Required moderate verbal and manual cues to improve functional mobility .  THERAPEUTIC EXERCISE: (see below for minutes):  Exercises per grid below to improve strength.  Required moderate verbal and manual cues to promote proper body alignment, promote proper body posture, promote proper body mechanics and promote proper body breathing techniques.  Progressed resistance, range, repetitions and complexity of movement as indicated.  MANUAL THERAPY: (see below for minutes): Joint mobilization and Soft tissue mobilization was utilized and necessary because of the patient's restricted joint motion, painful spasm, loss of articular motion and restricted motion of soft tissue.   MODALITIES: (see below for minutes):      for pain modulation    AQUATIC THERAPY (see below for minutes): Aquatic treatment performed per flow grid for Decreased muscle strength, Decreased endurance, Decreased static/dynamic balance and reactive control, Decreased activity

## 2024-04-03 ENCOUNTER — HOSPITAL ENCOUNTER (OUTPATIENT)
Dept: PHYSICAL THERAPY | Age: 44
Setting detail: RECURRING SERIES
Discharge: HOME | End: 2024-04-06
Attending: ORTHOPAEDIC SURGERY
Payer: COMMERCIAL

## 2024-04-03 PROCEDURE — 97113 AQUATIC THERAPY/EXERCISES: CPT

## 2024-04-03 NOTE — PROGRESS NOTES
Jax Duncan  : 1980  Primary: Umr (Commercial)  Secondary:  [facility] @ [place] [address]  Phone: [phone]  Fax: [phone] Plan Frequency: 1-2 times/week    Plan of Care/Certification Expiration Date: 24        Plan of Care/Certification Expiration Date:  Plan of Care/Certification Expiration Date: 24    Frequency/Duration:   Plan Frequency: 1-2 times/week      Time In/Out:   Time In: 0400  Time Out: 0456      PT Visit Info:    Plan Frequency: 1-2 times/week  Total # of Visits to Date: 20         OUTPATIENT PHYSICAL THERAPY:   Treatment Note  4/3/2024       Episode  (R hip pain)               Treatment Diagnosis:    Hip pain, right  Left hip pain  Muscle weakness (generalized)  Stiffness of hip joint, right  Stiffness of hip joint, left    Goals: (Goals have been discussed and agreed upon with patient.)  Short-Term Functional Goals: Time Frame: 8 weeks  Pt to report compliance with HEP - MET  Pt to report allen to sitting with less pain, movement changes - MET  Pt to allen R hip flexibility without guarding/pain - ongoing  Pt to amb WNL level surfaces without AD - ongoing  Discharge Goals: Time Frame: 20 weeks  Pt to increase strength for sit to stand x 30 reps without hip pain - ongoing  Pt to increase strength for reciprocal gait on stairs with controlled descent - ongoing  Pt to allen shopping without R hip pain limiting her - ongoing    Medical/Referring Diagnosis:    Right hip pain [M25.551]    Referring Physician:  Jurgen Best MD MD Orders:  PT Eval and Treat   Return MD Appt:   surgery   Date of Onset:  Onset Date: 07/15/23     Allergies:   Escitalopram and Hydrocodone-acetaminophen  Restrictions/Precautions:   Hip arthroscopy with labral repair, cam femoroplasty, rim trimming acetabuloplasty R      Interventions Planned (Treatment may consist of any combination of the following):     Functional Mobility Training, Gait

## 2024-04-08 ENCOUNTER — HOSPITAL ENCOUNTER (OUTPATIENT)
Dept: PHYSICAL THERAPY | Age: 44
Setting detail: RECURRING SERIES
Discharge: HOME | End: 2024-04-11
Attending: ORTHOPAEDIC SURGERY
Payer: COMMERCIAL

## 2024-04-08 PROCEDURE — 97113 AQUATIC THERAPY/EXERCISES: CPT

## 2024-04-08 NOTE — PROGRESS NOTES
Jax Duncan  : 1980  Primary: Umr (Commercial)  Secondary:  [facility] @ [place] [address]  Phone: [phone]  Fax: [phone] Plan Frequency: 1-2 times/week    Plan of Care/Certification Expiration Date: 24        Plan of Care/Certification Expiration Date:  Plan of Care/Certification Expiration Date: 24    Frequency/Duration:   Plan Frequency: 1-2 times/week      Time In/Out:   Time In: 0357  Time Out: 0501      PT Visit Info:    Plan Frequency: 1-2 times/week  Total # of Visits to Date: 21         OUTPATIENT PHYSICAL THERAPY:   Treatment Note  2024       Episode  (R hip pain)               Treatment Diagnosis:    Hip pain, right  Left hip pain  Muscle weakness (generalized)  Stiffness of hip joint, right  Stiffness of hip joint, left    Goals: (Goals have been discussed and agreed upon with patient.)  Short-Term Functional Goals: Time Frame: 8 weeks  Pt to report compliance with HEP - MET  Pt to report allen to sitting with less pain, movement changes - MET  Pt to allen R hip flexibility without guarding/pain - ongoing  Pt to amb WNL level surfaces without AD - ongoing  Discharge Goals: Time Frame: 20 weeks  Pt to increase strength for sit to stand x 30 reps without hip pain - ongoing  Pt to increase strength for reciprocal gait on stairs with controlled descent - ongoing  Pt to allen shopping without R hip pain limiting her - ongoing    Medical/Referring Diagnosis:    Right hip pain [M25.551]    Referring Physician:  Jurgen Best MD MD Orders:  PT Eval and Treat   Return MD Appt:   surgery   Date of Onset:  Onset Date: 07/15/23     Allergies:   Escitalopram and Hydrocodone-acetaminophen  Restrictions/Precautions:   Hip arthroscopy with labral repair, cam femoroplasty, rim trimming acetabuloplasty R      Interventions Planned (Treatment may consist of any combination of the following):     Functional Mobility Training, Gait

## 2024-04-09 ENCOUNTER — OFFICE VISIT (OUTPATIENT)
Dept: ORTHOPEDIC SURGERY | Age: 44
End: 2024-04-09
Payer: COMMERCIAL

## 2024-04-09 DIAGNOSIS — M24.552 LEFT HIP FLEXOR TIGHTNESS: ICD-10-CM

## 2024-04-09 DIAGNOSIS — M25.859 FEMORAL ACETABULAR IMPINGEMENT: ICD-10-CM

## 2024-04-09 DIAGNOSIS — M25.552 LEFT HIP PAIN: Primary | ICD-10-CM

## 2024-04-09 PROCEDURE — 99214 OFFICE O/P EST MOD 30 MIN: CPT | Performed by: ORTHOPAEDIC SURGERY

## 2024-04-09 NOTE — PROGRESS NOTES
Name: Jax Duncan  YOB: 1980  Gender: female  MRN: [id number]    CC: Hip Pain (L)     HPI: Jax Duncan is a 43 y.o. female who presents with Hip Pain (L)  She had previous surgery on the right hip.  She has similar popping symptoms on the left hip.  She states she really does not have any increased pain on this left side but she is concerned that she may have a cam deformity and should get this fixed.  She states he has already been through the right hip arthroscopic surgery and if she needs 1 on the left she will like to go ahead and do it.  She denies pain with changing positions.  Her biggest concern today is she does have some popping along this left hip and she is concerned that she may get a labral tear in the future.    ROS/Meds/PSH/PMH/FH/SH: I personally reviewed the patients standard intake form.  Below are the pertinents    Tobacco:  reports that she has never smoked. She has never used smokeless tobacco.  Diabetes: none  Other: SEAN right hip    Physical Examination:  General: no acute distress  Lungs: breathing easily  CV: regular rhythm by pulse  Left Hip: No pain with logroll.  No pain with straight leg raise.  No groin pain with HADLEY or FADIR testing.  No tenderness on the greater trochanteric bursa.  5/5 abduction and adduction strength.  Calf is soft and nontender to palpation.  Dorsi and plantarflexion was intact.  Dorsalis pedis pulse 2+.     Imaging:   Hip/pelvis XR: SEAN 4 views     Clinical Indication  1. Left hip pain    2. Left hip flexor tightness    3. Femoral acetabular impingement           Report: AP, frank and frog lateral, false profile x-ray of the Left hip demonstrates Cam deformity. Alpha angle of 55 degrees. Negative pincer lesion.     Impression: SEAN as above, no acute findings            All imaging interpreted by myself Jurgen Best MD independent of radiology review    Assessment:     ICD-10-CM    1. Left hip pain  M25.552 XR HIP LEFT MIN 4VW

## 2024-04-10 ENCOUNTER — HOSPITAL ENCOUNTER (OUTPATIENT)
Dept: PHYSICAL THERAPY | Age: 44
Setting detail: RECURRING SERIES
Discharge: HOME | End: 2024-04-13
Attending: ORTHOPAEDIC SURGERY
Payer: COMMERCIAL

## 2024-04-10 PROCEDURE — 97113 AQUATIC THERAPY/EXERCISES: CPT

## 2024-04-10 NOTE — PROGRESS NOTES
Training, Home Exercise Program (HEP), Manual Therapy, Pain Management, Range of Motion (ROM), Therapeutic Exercise/Strengthening, and Aquatic Therapy     Subjective Comments:    I had the x-ray of the other hip and he doesn't feel that I will need surgery on it since it isn't hurting after all it has been through with this other one.   Initial Pain Level:: 0/10  Post Session Pain Level:  0/10  Medications Last Reviewed:  4/10/2024  Updated Objective Findings:  3/18/24    Observation, Palpation, and Special Tests   Marked tenderness R hip add, flex, quad, post-lat hip      ROM   Deferred due to precautions/inability to allen AROM      Functional Mobility, Balance, and Gait   Stairs - step-to pattern    SLS - > 30 sec L, 0 R   Sit to stand - rigid trunk, UE assist, weight on L LE   Gait - amb with straight cane     Outcome Measure:   HOS:  45  1/25/24               3  2/15/24             23  3/18/24  Treatment   THERAPEUTIC ACTIVITY: ( see below for minutes):  Therapeutic activities per grid below to improve mobility.  Required moderate verbal and manual cues to improve functional mobility .  THERAPEUTIC EXERCISE: (see below for minutes):  Exercises per grid below to improve strength.  Required moderate verbal and manual cues to promote proper body alignment, promote proper body posture, promote proper body mechanics and promote proper body breathing techniques.  Progressed resistance, range, repetitions and complexity of movement as indicated.  MANUAL THERAPY: (see below for minutes): Joint mobilization and Soft tissue mobilization was utilized and necessary because of the patient's restricted joint motion, painful spasm, loss of articular motion and restricted motion of soft tissue.   MODALITIES: (see below for minutes):      for pain modulation    AQUATIC THERAPY (see below for minutes): Aquatic treatment performed per flow grid for Decreased muscle strength, Decreased endurance, Decreased static/dynamic balance and

## 2024-04-15 ENCOUNTER — HOSPITAL ENCOUNTER (OUTPATIENT)
Dept: PHYSICAL THERAPY | Age: 44
Setting detail: RECURRING SERIES
Discharge: HOME | End: 2024-04-18
Attending: ORTHOPAEDIC SURGERY
Payer: COMMERCIAL

## 2024-04-15 PROCEDURE — 97140 MANUAL THERAPY 1/> REGIONS: CPT

## 2024-04-15 NOTE — PROGRESS NOTES
Jax Duncan  : 1980  Primary: Umr (Commercial)  Secondary:  [facility] @ [place] [place] [address]  Phone: [phone]  Fax: [phone] Plan Frequency: 1-2 times/week    Plan of Care/Certification Expiration Date: 24        Plan of Care/Certification Expiration Date:  Plan of Care/Certification Expiration Date: 24    Frequency/Duration:   Plan Frequency: 1-2 times/week      Time In/Out:   Time In: 0  Time Out: 447      PT Visit Info:    Plan Frequency: 1-2 times/week  Total # of Visits to Date:          OUTPATIENT PHYSICAL THERAPY:   Treatment Note  4/15/2024       Episode  (R hip pain)               Treatment Diagnosis:    Hip pain, right  Left hip pain  Muscle weakness (generalized)  Stiffness of hip joint, right  Stiffness of hip joint, left    Goals: (Goals have been discussed and agreed upon with patient.)  Short-Term Functional Goals: Time Frame: 8 weeks  Pt to report compliance with HEP - MET  Pt to report allen to sitting with less pain, movement changes - MET  Pt to allen R hip flexibility without guarding/pain - ongoing  Pt to amb WNL level surfaces without AD - ongoing  Discharge Goals: Time Frame: 20 weeks  Pt to increase strength for sit to stand x 30 reps without hip pain - ongoing  Pt to increase strength for reciprocal gait on stairs with controlled descent - ongoing  Pt to allen shopping without R hip pain limiting her - ongoing    Medical/Referring Diagnosis:    Right hip pain [M25.551]    Referring Physician:  Jurgen Best MD MD Orders:  PT Eval and Treat   Return MD Appt:   surgery   Date of Onset:  Onset Date: 07/15/23     Allergies:   Escitalopram and Hydrocodone-acetaminophen  Restrictions/Precautions:   Hip arthroscopy with labral repair, cam femoroplasty, rim trimming acetabuloplasty R      Interventions Planned (Treatment may consist of any combination of the following):     Functional Mobility Training, Gait

## 2024-04-17 ENCOUNTER — HOSPITAL ENCOUNTER (OUTPATIENT)
Dept: PHYSICAL THERAPY | Age: 44
Setting detail: RECURRING SERIES
Discharge: HOME | End: 2024-04-20
Attending: ORTHOPAEDIC SURGERY
Payer: COMMERCIAL

## 2024-04-22 ENCOUNTER — HOSPITAL ENCOUNTER (OUTPATIENT)
Dept: PHYSICAL THERAPY | Age: 44
Setting detail: RECURRING SERIES
Discharge: HOME | End: 2024-04-25
Attending: ORTHOPAEDIC SURGERY
Payer: COMMERCIAL

## 2024-04-22 PROCEDURE — 97140 MANUAL THERAPY 1/> REGIONS: CPT

## 2024-04-22 PROCEDURE — 97113 AQUATIC THERAPY/EXERCISES: CPT
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
## 2024-04-22 NOTE — THERAPY RECERTIFICATION
Appt Desk     Future Appointments   Date Time Provider Department [place]   4/23/2024 11:20 AM Kayla Davidson PA [facility]   4/24/2024  4:00 PM Inez Jalloh, PT [facility]   4/29/2024  4:00 PM Inez Jalloh, PT [facility]   5/1/2024  4:00 PM Inez Jalloh, PT [facility]   5/6/2024  4:00 PM Inez Jalloh, PT [facility]   5/8/2024  4:00 PM Inez Jalloh, PT [facility]   5/13/2024  4:00 PM Inez Jalloh, PT [facility]   5/15/2024  4:00 PM Inez Jalloh, PT [facility]   5/20/2024  4:00 PM Inez Jalloh, PT [facility]   5/22/2024  4:00 PM Inez Jalloh, PT [facility]   5/29/2024  4:00 PM Inez Jalloh, PT [facility]   5/30/2024  9:10 AM Jurgen Best MD [facility] AM[facility]   6/4/2024  8:45 AM [facility] MISCELLANEOUS [facility] AM[facility]   6/10/2024  1:30 PM Daniel Cueto MD [facility] AM[facility]

## 2024-04-23 ENCOUNTER — OFFICE VISIT (OUTPATIENT)
Dept: ORTHOPEDIC SURGERY | Age: 44
End: 2024-04-23
Payer: COMMERCIAL

## 2024-04-23 VITALS — HEIGHT: 67 IN | BODY MASS INDEX: 29.03 KG/M2 | WEIGHT: 185 LBS

## 2024-04-23 DIAGNOSIS — M25.562 LEFT KNEE PAIN, UNSPECIFIED CHRONICITY: ICD-10-CM

## 2024-04-23 DIAGNOSIS — M25.561 RIGHT KNEE PAIN, UNSPECIFIED CHRONICITY: Primary | ICD-10-CM

## 2024-04-23 PROCEDURE — 99213 OFFICE O/P EST LOW 20 MIN: CPT | Performed by: PHYSICIAN ASSISTANT

## 2024-04-24 ENCOUNTER — HOSPITAL ENCOUNTER (OUTPATIENT)
Dept: PHYSICAL THERAPY | Age: 44
Setting detail: RECURRING SERIES
End: 2024-04-24
Attending: ORTHOPAEDIC SURGERY
Payer: COMMERCIAL

## 2024-04-29 ENCOUNTER — HOSPITAL ENCOUNTER (OUTPATIENT)
Dept: PHYSICAL THERAPY | Age: 44
Setting detail: RECURRING SERIES
Discharge: HOME | End: 2024-05-02
Attending: ORTHOPAEDIC SURGERY
Payer: COMMERCIAL

## 2024-04-29 PROCEDURE — 97140 MANUAL THERAPY 1/> REGIONS: CPT

## 2024-04-29 PROCEDURE — 97113 AQUATIC THERAPY/EXERCISES: CPT

## 2024-04-29 NOTE — PROGRESS NOTES
Jax Duncan  : 1980  Primary: Umr (Commercial)  Secondary:  [facility] @ [place] [place] [address]  Phone: [phone]  Fax: [phone] Plan Frequency: 1-2 times/week    Plan of Care/Certification Expiration Date: 24        Plan of Care/Certification Expiration Date:  Plan of Care/Certification Expiration Date: 24    Frequency/Duration:   Plan Frequency: 1-2 times/week      Time In/Out:   Time In: 5  Time Out: 4      PT Visit Info:    Plan Frequency: 1-2 times/week  Total # of Visits to Date: 24         OUTPATIENT PHYSICAL THERAPY:   Treatment Note 2024       Episode  (R hip pain)               Treatment Diagnosis:    Hip pain, right  Left hip pain  Muscle weakness (generalized)  Stiffness of hip joint, right  Stiffness of hip joint, left    Goals: (Goals have been discussed and agreed upon with patient.)  Short-Term Functional Goals: Time Frame: 8 weeks  Pt to report compliance with HEP - MET  Pt to report allen to sitting with less pain, movement changes - MET  Pt to allen R hip flexibility without guarding/pain - ongoing  Pt to amb WNL level surfaces without AD - MET  Discharge Goals: Time Frame: 20 weeks  Pt to increase strength for sit to stand x 30 reps without hip pain - ongoing  Pt to increase strength for reciprocal gait on stairs with controlled descent - ongoing  Pt to allen shopping without R hip pain limiting her - ongoing      Medical/Referring Diagnosis:    Right hip pain [M25.551]    Referring Physician:  Jurgen Best MD MD Orders:  PT Eval and Treat   Return MD Appt:   surgery   Date of Onset:  Onset Date: 07/15/23     Allergies:   Escitalopram and Hydrocodone-acetaminophen  Restrictions/Precautions:   Hip arthroscopy with labral repair, cam femoroplasty, rim trimming acetabuloplasty R      Interventions Planned (Treatment may consist of any combination of the following):     Functional Mobility Training, Gait

## 2024-05-01 ENCOUNTER — HOSPITAL ENCOUNTER (OUTPATIENT)
Dept: PHYSICAL THERAPY | Age: 44
Setting detail: RECURRING SERIES
Discharge: HOME | End: 2024-05-04
Attending: ORTHOPAEDIC SURGERY
Payer: COMMERCIAL

## 2024-05-01 PROCEDURE — 97140 MANUAL THERAPY 1/> REGIONS: CPT

## 2024-05-01 PROCEDURE — 97110 THERAPEUTIC EXERCISES: CPT

## 2024-05-01 NOTE — PROGRESS NOTES
Jax Duncan  : 1980  Primary: Umr (Commercial)  Secondary:  [facility] @ [address] [place] [address]  Phone: [phone]  Fax: [phone] Plan Frequency: 1-2 times/week    Plan of Care/Certification Expiration Date: 24        Plan of Care/Certification Expiration Date:  Plan of Care/Certification Expiration Date: 24    Frequency/Duration:   Plan Frequency: 1-2 times/week      Time In/Out:   Time In: 0400  Time Out: 0450      PT Visit Info:    Plan Frequency: 1-2 times/week  Total # of Visits to Date: 24         OUTPATIENT PHYSICAL THERAPY:   Treatment Note 2024       Episode  (R hip pain)               Treatment Diagnosis:    Hip pain, right  Left hip pain  Muscle weakness (generalized)  Stiffness of hip joint, right  Stiffness of hip joint, left    Goals: (Goals have been discussed and agreed upon with patient.)  Short-Term Functional Goals: Time Frame: 8 weeks  Pt to report compliance with HEP - MET  Pt to report allen to sitting with less pain, movement changes - MET  Pt to allen R hip flexibility without guarding/pain - ongoing  Pt to amb WNL level surfaces without AD - MET  Discharge Goals: Time Frame: 20 weeks  Pt to increase strength for sit to stand x 30 reps without hip pain - ongoing  Pt to increase strength for reciprocal gait on stairs with controlled descent - ongoing  Pt to allen shopping without R hip pain limiting her - ongoing      Medical/Referring Diagnosis:    Right hip pain [M25.551]    Referring Physician:  Jurgen Best MD MD Orders:  PT Eval and Treat   Return MD Appt:   surgery   Date of Onset:  Onset Date: 07/15/23     Allergies:   Escitalopram and Hydrocodone-acetaminophen  Restrictions/Precautions:   Hip arthroscopy with labral repair, cam femoroplasty, rim trimming acetabuloplasty R      Interventions Planned (Treatment may consist of any combination of the following):     Functional Mobility Training, Gait

## 2024-05-06 ENCOUNTER — HOSPITAL ENCOUNTER (OUTPATIENT)
Dept: PHYSICAL THERAPY | Age: 44
Setting detail: RECURRING SERIES
Discharge: HOME | End: 2024-05-09
Attending: ORTHOPAEDIC SURGERY
Payer: COMMERCIAL

## 2024-05-06 PROCEDURE — 97110 THERAPEUTIC EXERCISES: CPT

## 2024-05-06 PROCEDURE — 97140 MANUAL THERAPY 1/> REGIONS: CPT
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
## 2024-05-06 NOTE — PROGRESS NOTES
Name: Jax Duncan  YOB: 1980  Gender: female  MRN: [id number]    CC: Knee Pain (R)     HPI: Jax Duncan is a 43 y.o. female who returns for follow up and MRI results on her right knee.  She is been going to physical therapy and states she still having pain.  She has most of her pain is in the superior aspect of her knee and go down her patella tendon.  She is tender palpate of the patellar tendon today.  She has been taking for her pain.    Physical Examination:  General: no acute distress  Lungs: breathing easily  CV: regular rhythm by pulse  Right Knee:   No previous surgical scars present. No joint effusion present. Patella tracks centrally with no patellofemoral grind. Neutral mechanical alignment present. Passive ROM: 5 degrees of hyperextension with 120 degrees of flexion. There is clicking and pain at full extension.  Stable to varus and valgus stress at full extension and 30 degrees of flexion. Equivocal Lachman's.  negative anterior drawer. negative posterior drawer. negative dial test.  Tender to palpate over Medial joint line. Calf is soft and nontender to palpation. Motor and sensory intact distally. Dorsalis pedis pulse 2+.      Imaging:   MRI was reviewed today with Ms. Duncan.  She does have some soft tissue edema noted on the anterior portion of the patella tendon.  She has a little signaling along the patellar tendon insertion.  She has a little bit of fluid in her joint but not that significant.  PCL, ACL intact.  MCL LCL intact.  Menisci are intact.  No acute findings noted.  She does have some cartilage defect noted along the medial aspect of her femoral condyle with a little sub chondral edema underneath.  All imaging interpreted by myself Kayla Davidson PA-C independent of radiology review    Assessment:     ICD-10-CM    1. Right knee pain, unspecified chronicity  M25.561 Ambulatory Referral to [facility] [place]     predniSONE 5 MG (48) TBPK      2. Patellar tendinitis of

## 2024-05-07 ENCOUNTER — OFFICE VISIT (OUTPATIENT)
Dept: ORTHOPEDIC SURGERY | Age: 44
End: 2024-05-07
Payer: COMMERCIAL

## 2024-05-07 DIAGNOSIS — M25.561 RIGHT KNEE PAIN, UNSPECIFIED CHRONICITY: Primary | ICD-10-CM

## 2024-05-07 DIAGNOSIS — M76.51 PATELLAR TENDINITIS OF RIGHT KNEE: ICD-10-CM

## 2024-05-07 PROCEDURE — M5036 MISC REPAREL KNEE: HCPCS | Performed by: PHYSICIAN ASSISTANT

## 2024-05-07 PROCEDURE — 99214 OFFICE O/P EST MOD 30 MIN: CPT | Performed by: PHYSICIAN ASSISTANT

## 2024-05-07 RX ORDER — PREDNISONE 5 MG/1
TABLET ORAL
Qty: 1 EACH | Refills: 2 | Status: SHIPPED | OUTPATIENT
Start: 2024-05-07

## 2024-05-07 NOTE — PROGRESS NOTES
Patient was fitted and instructed on an [employer] Knee Sleeve for the right knee. Patient read and signed documenting they understand and agree to [facility]'s current DME return policy.

## 2024-05-08 ENCOUNTER — HOSPITAL ENCOUNTER (OUTPATIENT)
Dept: PHYSICAL THERAPY | Age: 44
Setting detail: RECURRING SERIES
Discharge: HOME | End: 2024-05-11
Attending: ORTHOPAEDIC SURGERY
Payer: COMMERCIAL

## 2024-05-08 PROCEDURE — 97113 AQUATIC THERAPY/EXERCISES: CPT

## 2024-05-08 PROCEDURE — 97140 MANUAL THERAPY 1/> REGIONS: CPT

## 2024-05-08 NOTE — PROGRESS NOTES
Jax Duncan  : 1980  Primary: Umr (Commercial)  Secondary:  [facility] @ [address] [place] [address]  Phone: [phone]  Fax: [phone] Plan Frequency: 1-2 times/week    Plan of Care/Certification Expiration Date: 24        Plan of Care/Certification Expiration Date:  Plan of Care/Certification Expiration Date: 24    Frequency/Duration:   Plan Frequency: 1-2 times/week      Time In/Out:   Time In: 0  Time Out: 447      PT Visit Info:    Plan Frequency: 1-2 times/week  Total # of Visits to Date: 28         OUTPATIENT PHYSICAL THERAPY:   Treatment Note 2024       Episode  (R hip pain)               Treatment Diagnosis:    Hip pain, right  Left hip pain  Muscle weakness (generalized)  Stiffness of hip joint, right  Stiffness of hip joint, left    Goals: (Goals have been discussed and agreed upon with patient.)  Short-Term Functional Goals: Time Frame: 8 weeks  Pt to report compliance with HEP - MET  Pt to report allen to sitting with less pain, movement changes - MET  Pt to allen R hip flexibility without guarding/pain - ongoing  Pt to amb WNL level surfaces without AD - MET  Discharge Goals: Time Frame: 20 weeks  Pt to increase strength for sit to stand x 30 reps without hip pain - ongoing  Pt to increase strength for reciprocal gait on stairs with controlled descent - ongoing  Pt to allen shopping without R hip pain limiting her - ongoing      Medical/Referring Diagnosis:    Right hip pain [M25.551]    Referring Physician:  Jurgen Best MD MD Orders:  PT Eval and Treat   Return MD Appt:   surgery   Date of Onset:  Onset Date: 07/15/23     Allergies:   Escitalopram and Hydrocodone-acetaminophen  Restrictions/Precautions:   Hip arthroscopy with labral repair, cam femoroplasty, rim trimming acetabuloplasty R      Interventions Planned (Treatment may consist of any combination of the following):     Functional Mobility Training, Gait

## 2024-05-13 ENCOUNTER — HOSPITAL ENCOUNTER (OUTPATIENT)
Dept: PHYSICAL THERAPY | Age: 44
Setting detail: RECURRING SERIES
Discharge: HOME | End: 2024-05-16
Attending: ORTHOPAEDIC SURGERY
Payer: COMMERCIAL

## 2024-05-13 PROCEDURE — 97113 AQUATIC THERAPY/EXERCISES: CPT

## 2024-05-13 PROCEDURE — 97140 MANUAL THERAPY 1/> REGIONS: CPT

## 2024-05-13 NOTE — PROGRESS NOTES
Training, Home Exercise Program (HEP), Manual Therapy, Pain Management, Range of Motion (ROM), Therapeutic Exercise/Strengthening, and Aquatic Therapy     Subjective Comments: I did something to my back this weekend on the L side.  It is killing me.    Initial Pain Level:: 2/10  Post Session Pain Level:  no increased pain with ex's  Medications Last Reviewed:  5/13/2024  Updated Objective Findings:  4/22/24    Observation, Palpation, and Special Tests   Tight/tender R hip flex, prox quad, add      ROM   Decreased R hip ER      Functional Mobility, Balance, and Gait   Stairs - step-to pattern    SLS - > 30 sec B   Sit to stand - x 30   Gait - WNL     Outcome Measure:   HOS:  45  1/25/24               3  2/15/24             23  3/18/24             42  4/22/24  Treatment   THERAPEUTIC ACTIVITY: ( see below for minutes):  Therapeutic activities per grid below to improve mobility.  Required moderate verbal and manual cues to improve functional mobility .  THERAPEUTIC EXERCISE: (see below for minutes):  Exercises per grid below to improve strength.  Required moderate verbal and manual cues to promote proper body alignment, promote proper body posture, promote proper body mechanics and promote proper body breathing techniques.  Progressed resistance, range, repetitions and complexity of movement as indicated.  MANUAL THERAPY: (see below for minutes): Joint mobilization and Soft tissue mobilization was utilized and necessary because of the patient's restricted joint motion, painful spasm, loss of articular motion and restricted motion of soft tissue.   MODALITIES: (see below for minutes):      for pain modulation    AQUATIC THERAPY (see below for minutes): Aquatic treatment performed per flow grid for Decreased muscle strength, Decreased endurance, Decreased static/dynamic balance and reactive control, Decreased activity endurance, Decompression, Ease of movement and Low impact and reduced weight bearing activity.

## 2024-05-15 ENCOUNTER — HOSPITAL ENCOUNTER (OUTPATIENT)
Dept: PHYSICAL THERAPY | Age: 44
Setting detail: RECURRING SERIES
Discharge: HOME | End: 2024-05-18
Attending: ORTHOPAEDIC SURGERY
Payer: COMMERCIAL

## 2024-05-15 PROCEDURE — 97140 MANUAL THERAPY 1/> REGIONS: CPT

## 2024-05-15 PROCEDURE — 97110 THERAPEUTIC EXERCISES: CPT

## 2024-05-15 NOTE — PROGRESS NOTES
Training, Home Exercise Program (HEP), Manual Therapy, Pain Management, Range of Motion (ROM), Therapeutic Exercise/Strengthening, and Aquatic Therapy     Subjective Comments: I'm not going to do the pool today because I have to prepare family dinner when I leave.  I need you to work on my back and my thigh some.  Initial Pain Level:: 2/10  Post Session Pain Level:  no increased pain with ex's  Medications Last Reviewed:  5/15/2024  Updated Objective Findings:  4/22/24    Observation, Palpation, and Special Tests   Tight/tender R hip flex, prox quad, add      ROM   Decreased R hip ER      Functional Mobility, Balance, and Gait   Stairs - step-to pattern      Outcome Measure:   HOS:  47  1/25/24               3  2/15/24             23  3/18/24             42  4/22/24  Treatment   THERAPEUTIC ACTIVITY: ( see below for minutes):  Therapeutic activities per grid below to improve mobility.  Required moderate verbal and manual cues to improve functional mobility .  THERAPEUTIC EXERCISE: (see below for minutes):  Exercises per grid below to improve strength.  Required moderate verbal and manual cues to promote proper body alignment, promote proper body posture, promote proper body mechanics and promote proper body breathing techniques.  Progressed resistance, range, repetitions and complexity of movement as indicated.  MANUAL THERAPY: (see below for minutes): Joint mobilization and Soft tissue mobilization was utilized and necessary because of the patient's restricted joint motion, painful spasm, loss of articular motion and restricted motion of soft tissue.   MODALITIES: (see below for minutes):      for pain modulation    AQUATIC THERAPY (see below for minutes): Aquatic treatment performed per flow grid for Decreased muscle strength, Decreased endurance, Decreased static/dynamic balance and reactive control, Decreased activity endurance, Decompression, Ease of movement and Low impact and reduced weight bearing

## 2024-05-20 ENCOUNTER — HOSPITAL ENCOUNTER (OUTPATIENT)
Dept: PHYSICAL THERAPY | Age: 44
Setting detail: RECURRING SERIES
Discharge: HOME | End: 2024-05-23
Attending: ORTHOPAEDIC SURGERY
Payer: COMMERCIAL

## 2024-05-20 PROCEDURE — 97140 MANUAL THERAPY 1/> REGIONS: CPT

## 2024-05-20 PROCEDURE — 97110 THERAPEUTIC EXERCISES: CPT

## 2024-05-20 NOTE — PROGRESS NOTES
Jax Duncan  : 1980  Primary: Umr (Commercial)  Secondary:  [facility] @ [place] [address]  Phone: [phone]  Fax: [phone] Plan Frequency: 1-2 times/week    Plan of Care/Certification Expiration Date: 24        Plan of Care/Certification Expiration Date:  Plan of Care/Certification Expiration Date: 24    Frequency/Duration:   Plan Frequency: 1-2 times/week      Time In/Out:   Time In: 0400  Time Out: 0450      PT Visit Info:    Plan Frequency: 1-2 times/week  Total # of Visits to Date: 31         OUTPATIENT PHYSICAL THERAPY:   Treatment Note 2024       Episode  (R hip pain)               Treatment Diagnosis:    Hip pain, right  Left hip pain  Muscle weakness (generalized)  Stiffness of hip joint, right  Stiffness of hip joint, left    Goals: (Goals have been discussed and agreed upon with patient.)  Short-Term Functional Goals: Time Frame: 8 weeks  Pt to report compliance with HEP - MET  Pt to report allen to sitting with less pain, movement changes - MET  Pt to allen R hip flexibility without guarding/pain - ongoing  Pt to amb WNL level surfaces without AD - MET  Discharge Goals: Time Frame: 20 weeks  Pt to increase strength for sit to stand x 30 reps without hip pain - ongoing  Pt to increase strength for reciprocal gait on stairs with controlled descent - ongoing  Pt to allen shopping without R hip pain limiting her - ongoing      Medical/Referring Diagnosis:    Right hip pain [M25.551]    Referring Physician:  Jurgen Best MD MD Orders:  PT Eval and Treat   Return MD Appt:   surgery   Date of Onset:  Onset Date: 07/15/23     Allergies:   Escitalopram and Hydrocodone-acetaminophen  Restrictions/Precautions:   Hip arthroscopy with labral repair, cam femoroplasty, rim trimming acetabuloplasty R      Interventions Planned (Treatment may consist of any combination of the following):     Functional Mobility Training, Gait

## 2024-05-22 ENCOUNTER — HOSPITAL ENCOUNTER (OUTPATIENT)
Dept: PHYSICAL THERAPY | Age: 44
Setting detail: RECURRING SERIES
Discharge: HOME | End: 2024-05-25
Attending: ORTHOPAEDIC SURGERY
Payer: COMMERCIAL

## 2024-05-22 PROCEDURE — 97113 AQUATIC THERAPY/EXERCISES: CPT

## 2024-05-22 NOTE — THERAPY RECERTIFICATION
Jax Duncan  : 1980  Primary: Umr (Commercial)  Secondary:  [facility] @ [place] [address]  Phone: [phone]  Fax: [phone] Plan Frequency: 1-2 times/week    Plan of Care/Certification Expiration Date: 24        Plan of Care/Certification Expiration Date:  Plan of Care/Certification Expiration Date: 24    Frequency/Duration:   Plan Frequency: 1-2 times/week      Time In/Out:   Time In: 350  Time Out: 454      PT Visit Info:    Plan Frequency: 1-2 times/week  Total # of Visits to Date: 32         OUTPATIENT PHYSICAL THERAPY:   Treatment Note and Recertification Note 2024       Episode  (R hip pain)               Treatment Diagnosis:    Hip pain, right  Left hip pain  Muscle weakness (generalized)  Stiffness of hip joint, right  Stiffness of hip joint, left    Goals: (Goals have been discussed and agreed upon with patient.)  Short-Term Functional Goals: Time Frame: 8 weeks  Pt to report compliance with HEP - MET  Pt to report allen to sitting with less pain, movement changes - MET  Pt to allen R hip flexibility without guarding/pain - ongoing  Pt to amb WNL level surfaces without AD - MET  Discharge Goals: Time Frame: 20 weeks  Pt to increase strength for sit to stand x 30 reps without hip pain - MET  Pt to increase strength for reciprocal gait on stairs with controlled descent - MET  Pt to allen shopping without R hip pain limiting her - ongoing    Regarding Jax Duncan's therapy, I certify that the treatment plan above will be carried out by a therapist or under their direction.  Thank you for this referral,  CISCO STAFFORD, PT     Referring Physician Signature: Jurgen Best MD       Medical/Referring Diagnosis:    Right hip pain [M25.551]    Referring Physician:  Jurgen Best MD MD Orders:  PT Eval and Treat   Return MD Appt:   surgery   Date of Onset:  Onset Date: 07/15/23     Allergies:   Escitalopram and

## 2024-05-30 ENCOUNTER — OFFICE VISIT (OUTPATIENT)
Dept: ORTHOPEDIC SURGERY | Age: 44
End: 2024-05-30
Payer: COMMERCIAL

## 2024-05-30 DIAGNOSIS — Z09 STATUS POST ORTHOPEDIC SURGERY, FOLLOW-UP EXAM: Primary | ICD-10-CM

## 2024-05-30 DIAGNOSIS — M76.51 PATELLAR TENDINITIS OF RIGHT KNEE: ICD-10-CM

## 2024-05-30 PROCEDURE — 99213 OFFICE O/P EST LOW 20 MIN: CPT | Performed by: ORTHOPAEDIC SURGERY

## 2024-05-30 NOTE — PROGRESS NOTES
Name: Jax Duncan  YOB: 1980  Gender: female  MRN: [id number]      CC: Hip Pain (R)       HPI: Jax Duncan is a 43 y.o. female who returns for follow up on her right hip. She is 3 months post status right hip arthroscopy. She is meeting all her goals and doing well.  Her main complaint today is her knee pain.  She continues to have pain as well as swelling she is unable to squat down functionally        Physical Examination:  General: no acute distress  Lungs: breathing easily  CV: regular rhythm by pulse  Right Knee: Trace effusion no joint line tenderness no focal tenderness.  No instability.    Right hip she does have some adductor tightness and tenderness palpation nurses this improves with physical therapy.  External rotation is slightly limited today but she says this loosens up with therapy.  No pain with impingement testing or extremes of motion.        Assessment:     ICD-10-CM    1. Status post orthopedic surgery, follow-up exam  Z09       2. Patellar tendinitis of right knee  M76.51           Plan:   Her hips progressing very well see her back in about 2 months I will collaborate with her physical therapist on some further treatment options for her knee.            Jurgen Best MD, FAAOS  Orthopaedics and Sports Medicine

## 2024-06-03 ENCOUNTER — APPOINTMENT (OUTPATIENT)
Dept: PHYSICAL THERAPY | Age: 44
End: 2024-06-03
Attending: ORTHOPAEDIC SURGERY
Payer: COMMERCIAL

## 2024-06-03 ENCOUNTER — HOSPITAL ENCOUNTER (OUTPATIENT)
Dept: PHYSICAL THERAPY | Age: 44
Setting detail: RECURRING SERIES
End: 2024-06-03
Attending: ORTHOPAEDIC SURGERY
Payer: COMMERCIAL

## 2024-06-04 ENCOUNTER — NURSE ONLY (OUTPATIENT)
Dept: FAMILY MEDICINE CLINIC | Facility: CLINIC | Age: 44
End: 2024-06-04

## 2024-06-04 DIAGNOSIS — Z13.220 LIPID SCREENING: ICD-10-CM

## 2024-06-04 DIAGNOSIS — Z13.1 DIABETES MELLITUS SCREENING: ICD-10-CM

## 2024-06-04 DIAGNOSIS — E55.9 VITAMIN D DEFICIENCY: ICD-10-CM

## 2024-06-04 DIAGNOSIS — Z00.00 WELL ADULT HEALTH CHECK: ICD-10-CM

## 2024-06-04 LAB
25(OH)D3 SERPL-MCNC: 43.5 NG/ML (ref 30–100)
ALBUMIN SERPL-MCNC: 4.3 G/DL (ref 3.5–5)
ALBUMIN/GLOB SERPL: 2.1 (ref 1–1.9)
ALP SERPL-CCNC: 53 U/L (ref 35–104)
ALT SERPL-CCNC: 17 U/L (ref 12–65)
ANION GAP SERPL CALC-SCNC: 9 MMOL/L (ref 9–18)
AST SERPL-CCNC: 18 U/L (ref 15–37)
BASOPHILS # BLD: 0 K/UL (ref 0–0.2)
BASOPHILS NFR BLD: 1 % (ref 0–2)
BILIRUB SERPL-MCNC: 0.6 MG/DL (ref 0–1.2)
BUN SERPL-MCNC: 18 MG/DL (ref 6–23)
CALCIUM SERPL-MCNC: 9.3 MG/DL (ref 8.8–10.2)
CHLORIDE SERPL-SCNC: 106 MMOL/L (ref 98–107)
CHOLEST SERPL-MCNC: 175 MG/DL (ref 0–200)
CO2 SERPL-SCNC: 24 MMOL/L (ref 20–28)
CREAT SERPL-MCNC: 0.69 MG/DL (ref 0.6–1.1)
DIFFERENTIAL METHOD BLD: ABNORMAL
EOSINOPHIL # BLD: 0.2 K/UL (ref 0–0.8)
EOSINOPHIL NFR BLD: 4 % (ref 0.5–7.8)
ERYTHROCYTE [DISTWIDTH] IN BLOOD BY AUTOMATED COUNT: 11.7 % (ref 11.9–14.6)
EST. AVERAGE GLUCOSE BLD GHB EST-MCNC: 94 MG/DL
GLOBULIN SER CALC-MCNC: 2.1 G/DL (ref 2.3–3.5)
GLUCOSE SERPL-MCNC: 90 MG/DL (ref 70–99)
HBA1C MFR BLD: 4.9 % (ref 0–5.6)
HCT VFR BLD AUTO: 41.6 % (ref 35.8–46.3)
HDLC SERPL-MCNC: 56 MG/DL (ref 40–60)
HDLC SERPL: 3.1 (ref 0–5)
HGB BLD-MCNC: 13.8 G/DL (ref 11.7–15.4)
IMM GRANULOCYTES # BLD AUTO: 0 K/UL (ref 0–0.5)
IMM GRANULOCYTES NFR BLD AUTO: 0 % (ref 0–5)
LDLC SERPL CALC-MCNC: 108 MG/DL (ref 0–100)
LYMPHOCYTES # BLD: 1.4 K/UL (ref 0.5–4.6)
LYMPHOCYTES NFR BLD: 32 % (ref 13–44)
MCH RBC QN AUTO: 30.3 PG (ref 26.1–32.9)
MCHC RBC AUTO-ENTMCNC: 33.2 G/DL (ref 31.4–35)
MCV RBC AUTO: 91.2 FL (ref 82–102)
MONOCYTES # BLD: 0.3 K/UL (ref 0.1–1.3)
MONOCYTES NFR BLD: 7 % (ref 4–12)
NEUTS SEG # BLD: 2.5 K/UL (ref 1.7–8.2)
NEUTS SEG NFR BLD: 56 % (ref 43–78)
NRBC # BLD: 0 K/UL (ref 0–0.2)
PLATELET # BLD AUTO: 227 K/UL (ref 150–450)
PMV BLD AUTO: 10.7 FL (ref 9.4–12.3)
POTASSIUM SERPL-SCNC: 4.2 MMOL/L (ref 3.5–5.1)
PROT SERPL-MCNC: 6.4 G/DL (ref 6.3–8.2)
RBC # BLD AUTO: 4.56 M/UL (ref 4.05–5.2)
SODIUM SERPL-SCNC: 139 MMOL/L (ref 136–145)
TRIGL SERPL-MCNC: 54 MG/DL (ref 0–150)
TSH, 3RD GENERATION: 1.35 UIU/ML (ref 0.27–4.2)
VLDLC SERPL CALC-MCNC: 11 MG/DL (ref 6–23)
WBC # BLD AUTO: 4.4 K/UL (ref 4.3–11.1)

## 2024-06-06 NOTE — RESULT ENCOUNTER NOTE
Call back LDL or bad cholesterol slightly increased but very good HDL, good cholesterol.  Electrolytes and kidney and liver function appear appropriate.  White blood cell count hemoglobin and platelets normal.  Thyroid level normal.  Vitamin D level appropriate at 43.5.  A1c level, longstanding blood sugar, normal at 4.9%.  Can discuss these further at planned follow-up.

## 2024-06-07 ASSESSMENT — PATIENT HEALTH QUESTIONNAIRE - PHQ9
SUM OF ALL RESPONSES TO PHQ QUESTIONS 1-9: 0
1. LITTLE INTEREST OR PLEASURE IN DOING THINGS: NOT AT ALL
2. FEELING DOWN, DEPRESSED OR HOPELESS: NOT AT ALL
SUM OF ALL RESPONSES TO PHQ9 QUESTIONS 1 & 2: 0
SUM OF ALL RESPONSES TO PHQ QUESTIONS 1-9: 0
1. LITTLE INTEREST OR PLEASURE IN DOING THINGS: NOT AT ALL
SUM OF ALL RESPONSES TO PHQ QUESTIONS 1-9: 0
SUM OF ALL RESPONSES TO PHQ9 QUESTIONS 1 & 2: 0
2. FEELING DOWN, DEPRESSED OR HOPELESS: NOT AT ALL
SUM OF ALL RESPONSES TO PHQ QUESTIONS 1-9: 0

## 2024-06-10 ENCOUNTER — OFFICE VISIT (OUTPATIENT)
Dept: FAMILY MEDICINE CLINIC | Facility: CLINIC | Age: 44
End: 2024-06-10
Payer: COMMERCIAL

## 2024-06-10 ENCOUNTER — APPOINTMENT (OUTPATIENT)
Dept: PHYSICAL THERAPY | Age: 44
End: 2024-06-10
Attending: ORTHOPAEDIC SURGERY
Payer: COMMERCIAL

## 2024-06-10 VITALS
SYSTOLIC BLOOD PRESSURE: 108 MMHG | HEART RATE: 70 BPM | HEIGHT: 67 IN | TEMPERATURE: 98.9 F | BODY MASS INDEX: 28.56 KG/M2 | WEIGHT: 182 LBS | RESPIRATION RATE: 14 BRPM | DIASTOLIC BLOOD PRESSURE: 73 MMHG | OXYGEN SATURATION: 99 %

## 2024-06-10 DIAGNOSIS — M25.561 RIGHT KNEE PAIN, UNSPECIFIED CHRONICITY: ICD-10-CM

## 2024-06-10 DIAGNOSIS — Z00.00 WELL ADULT HEALTH CHECK: Primary | ICD-10-CM

## 2024-06-10 DIAGNOSIS — E78.00 ELEVATED LDL CHOLESTEROL LEVEL: ICD-10-CM

## 2024-06-10 DIAGNOSIS — Z12.31 VISIT FOR SCREENING MAMMOGRAM: ICD-10-CM

## 2024-06-10 DIAGNOSIS — K59.09 CHRONIC CONSTIPATION: ICD-10-CM

## 2024-06-10 PROBLEM — S73.191A TEAR OF ACETABULAR LABRUM, RIGHT, INITIAL ENCOUNTER: Status: RESOLVED | Noted: 2024-01-25 | Resolved: 2024-06-10

## 2024-06-10 PROBLEM — S73.191A TEAR OF RIGHT ACETABULAR LABRUM: Status: RESOLVED | Noted: 2024-01-23 | Resolved: 2024-06-10

## 2024-06-10 PROBLEM — M25.859 FEMORAL ACETABULAR IMPINGEMENT: Status: RESOLVED | Noted: 2024-01-23 | Resolved: 2024-06-10

## 2024-06-10 PROCEDURE — 99396 PREV VISIT EST AGE 40-64: CPT | Performed by: FAMILY MEDICINE

## 2024-06-10 RX ORDER — MELOXICAM 15 MG/1
15 TABLET ORAL DAILY PRN
COMMUNITY

## 2024-06-10 NOTE — PROGRESS NOTES
Topics    Alcohol use: Yes     Alcohol/week: 1.0 standard drink of alcohol     Types: 1 Shots of liquor per week    Drug use: No       Physical Exam:  Blood pressure 108/73, pulse 70, temperature 98.9 °F (37.2 °C), temperature source Temporal, resp. rate 14, height 1.702 m (5' 7\"), weight 82.6 kg (182 lb), SpO2 99 %.  HEENT: TMs and external canals clear.  EOMI.  Nasal mucosa and oropharynx moist and intact.    Neck: supple, no cervical adenopathy; no appreciable thyromegaly or thyroid nodules; appropriate carotid upstroke.   Lungs: normal inspiratory movement, clear with good breath sounds and no rales, wheezes, or rhonchi  CV: Normal S1 and S2 with regular rate and rhythm, no murmurs or rubs or gallops; radial and femoral pulses palpable          Abdomen: soft and nontender, no masses or hepatosplenomegaly; positive bowel sounds.  Extremities: no peripheral edema or cyanosis; brief examination of the right knee reveals no patellar ballottement today or palpable patellar tendon discomfort  Neurological: alert and oriented x 3; normal gait and 2+ patellar deep tendon reflexes  Dermatological: skin warm dry and intact without significant rashes or concerning lesions.  No inguinal or axillary lymphadenopathy.  Affect is appropriate.         Assessment and Plan:   Diagnosis Orders   1. Well adult health check        2. Right knee pain, unspecified chronicity        3. Chronic constipation        4. Elevated LDL cholesterol level        5. Visit for screening mammogram  JOHNNY ALBERT DIGITAL SCREEN BILATERAL      6. BMI 28.0-28.9,adult          Reviewed with patient where she had her right hip surgery in February and where she continues with physical therapy, mostly aquatic, for this and the resulting notation for persistent right knee pain specifically with deep knee bend/squat.  She will follow-up July 30 with Dr. Jurgen Best.  Reviewed with patient her recent fasting labs including slightly increased LDL cholesterol but

## 2024-06-12 ENCOUNTER — HOSPITAL ENCOUNTER (OUTPATIENT)
Dept: PHYSICAL THERAPY | Age: 44
Setting detail: RECURRING SERIES
Discharge: HOME | End: 2024-06-15
Attending: ORTHOPAEDIC SURGERY
Payer: COMMERCIAL

## 2024-06-12 PROCEDURE — 97113 AQUATIC THERAPY/EXERCISES: CPT

## 2024-06-12 NOTE — PROGRESS NOTES
Decreased static/dynamic balance and reactive control, Decreased activity endurance, Decompression, Ease of movement and Low impact and reduced weight bearing activity.                                          Date: 5/15/24  Visit 30 5/20/24  Visit 31 5/22/24  Visit 32  PN 5/29/24  Visit 33 6/12/24  Visit 34   Modalities:                Manual Therapy: 36 mins 30 mins      STM R ant hip/adductors/distal med hams Prone, supine supine              Aquatics Activities:   64 mins 50 mins 48 mins   GAIT (F/B/S/M)   4 laps 4 laps 4 laps   Blue fins   SLR gait   4 laps 4 laps 4 laps   Blue fins   Hamstring Curl gait    4 laps 4 laps 4 laps  Blue fins   Rockettes   4 laps 4 laps 4 laps   Blue fins   Squats   X 30  On step X 30  On step    Calf raises singles B   X 30  On step X 30  On step    Hamstring stretch B   3 x 15 sec 3 x 15 sec    Single leg squats B   X 30 X 30    SLS B   X 3 X 3    Wonder board laps   To fatigue To fatigue To fatigue  Blue fins   Deep well   To fatigue     H/K flex on step     X 5           Therapeutic Exercises: 12 mins 20 mins      Pt education, postural education, HEP, functional breathing Lifting/stance mechanics    Review of sxs, HEP, options   Bent knee fall out stretch R  passively      Piriformis stretch L 3 x 15 sec 3 x 15 sec      Squats   X 30      Calf raises B  X 30      Single leg squats B   X 20      SLS B  X 3              HEP: see hand out       Treatment/Session Summary:    Treatment Assessment:   Pt continues to work hard with strengthening and end range motion.  To focus on stretching to assess change in sxs as it doesn't appear as much strength related since pain levels/inability to squat deep is unchanged in the pool.  Communication/Consultation:  None today  Equipment provided today:  None  Recommendations/Intent for next treatment session: Next visit will focus on stretching, strengthening, manual techniques as indicated.    Total Treatment Billable Duration:  48 minutes

## 2024-06-17 ENCOUNTER — APPOINTMENT (OUTPATIENT)
Dept: PHYSICAL THERAPY | Age: 44
End: 2024-06-17
Attending: ORTHOPAEDIC SURGERY
Payer: COMMERCIAL

## 2024-06-19 ENCOUNTER — HOSPITAL ENCOUNTER (OUTPATIENT)
Dept: PHYSICAL THERAPY | Age: 44
Setting detail: RECURRING SERIES
Discharge: HOME | End: 2024-06-22
Attending: ORTHOPAEDIC SURGERY
Payer: COMMERCIAL

## 2024-06-19 PROCEDURE — 97113 AQUATIC THERAPY/EXERCISES: CPT

## 2024-06-19 NOTE — PROGRESS NOTES
Jax Duncan  : 1980  Primary: Umr (Commercial)  Secondary:  [facility] @ [place] [address]  Phone: [phone]  Fax: [phone] Plan Frequency: 1-2 times/week    Plan of Care/Certification Expiration Date: 24        Plan of Care/Certification Expiration Date:  Plan of Care/Certification Expiration Date: 24    Frequency/Duration:   Plan Frequency: 1-2 times/week      Time In/Out:   Time In: 0350  Time Out: 0453      PT Visit Info:    Plan Frequency: 1-2 times/week  Total # of Visits to Date: 35         OUTPATIENT PHYSICAL THERAPY:   Treatment Note  2024       Episode  (R hip pain)               Treatment Diagnosis:    Hip pain, right  Left hip pain  Muscle weakness (generalized)  Stiffness of hip joint, right  Stiffness of hip joint, left    Goals: (Goals have been discussed and agreed upon with patient.)  Short-Term Functional Goals: Time Frame: 8 weeks  Pt to report compliance with HEP - MET  Pt to report allen to sitting with less pain, movement changes - MET  Pt to allen R hip flexibility without guarding/pain - ongoing  Pt to amb WNL level surfaces without AD - MET  Discharge Goals: Time Frame: 20 weeks  Pt to increase strength for sit to stand x 30 reps without hip pain - MET  Pt to increase strength for reciprocal gait on stairs with controlled descent - MET  Pt to allen shopping without R hip pain limiting her - ongoing    Medical/Referring Diagnosis:    Right hip pain [M25.551]    Referring Physician:  Jurgen Best MD MD Orders:  PT Eval and Treat   Return MD Appt:   surgery   Date of Onset:  Onset Date: 07/15/23     Allergies:   Escitalopram and Hydrocodone-acetaminophen  Restrictions/Precautions:   Hip arthroscopy with labral repair, cam femoroplasty, rim trimming acetabuloplasty R      Interventions Planned (Treatment may consist of any combination of the following):     Functional Mobility Training, Gait Training,

## 2024-06-24 ENCOUNTER — HOSPITAL ENCOUNTER (OUTPATIENT)
Dept: PHYSICAL THERAPY | Age: 44
Setting detail: RECURRING SERIES
End: 2024-06-24
Attending: ORTHOPAEDIC SURGERY
Payer: COMMERCIAL

## 2024-06-24 ENCOUNTER — APPOINTMENT (OUTPATIENT)
Dept: PHYSICAL THERAPY | Age: 44
End: 2024-06-24
Attending: ORTHOPAEDIC SURGERY
Payer: COMMERCIAL

## 2024-06-24 PROCEDURE — 97140 MANUAL THERAPY 1/> REGIONS: CPT

## 2024-06-24 NOTE — DISCHARGE SUMMARY
Jax Duncan  : 1980  Primary: Umr (Commercial)  Secondary:  [facility] @ [place] [address]  Phone: [phone]  Fax: [phone] Plan Frequency: 1-2 times/week    Plan of Care/Certification Expiration Date: 24        Plan of Care/Certification Expiration Date:  Plan of Care/Certification Expiration Date: 24    Frequency/Duration:   Plan Frequency: 1-2 times/week      Time In/Out:   Time In: 0  Time Out: 415      PT Visit Info:    Plan Frequency: 1-2 times/week  Total # of Visits to Date: 36         OUTPATIENT PHYSICAL THERAPY:   Treatment Note and Discharge Summary 2024       Episode  (R hip pain)               Treatment Diagnosis:    Hip pain, right  Left hip pain  Muscle weakness (generalized)  Stiffness of hip joint, right  Stiffness of hip joint, left    Goals: (Goals have been discussed and agreed upon with patient.)  Short-Term Functional Goals: Time Frame: 8 weeks  Pt to report compliance with HEP - MET  Pt to report allen to sitting with less pain, movement changes - MET  Pt to allen R hip flexibility without guarding/pain - MET  Pt to amb WNL level surfaces without AD - MET  Discharge Goals: Time Frame: 20 weeks  Pt to increase strength for sit to stand x 30 reps without hip pain - MET  Pt to increase strength for reciprocal gait on stairs with controlled descent - MET  Pt to allen shopping without R hip pain limiting her - MET    Medical/Referring Diagnosis:    Right hip pain [M25.551]    Referring Physician:  Jurgen Best MD MD Orders:  PT Eval and Treat   Return MD Appt:   surgery   Date of Onset:  Onset Date: 07/15/23     Allergies:   Escitalopram and Hydrocodone-acetaminophen  Restrictions/Precautions:   Hip arthroscopy with labral repair, cam femoroplasty, rim trimming acetabuloplasty R      Interventions Planned (Treatment may consist of any combination of the following):     Functional Mobility Training, Gait

## 2024-07-30 ENCOUNTER — OFFICE VISIT (OUTPATIENT)
Dept: ORTHOPEDIC SURGERY | Age: 44
End: 2024-07-30
Payer: COMMERCIAL

## 2024-07-30 DIAGNOSIS — Z09 STATUS POST ORTHOPEDIC SURGERY, FOLLOW-UP EXAM: Primary | ICD-10-CM

## 2024-07-30 PROCEDURE — 99213 OFFICE O/P EST LOW 20 MIN: CPT | Performed by: ORTHOPAEDIC SURGERY

## 2024-07-30 NOTE — PROGRESS NOTES
Name: Jax Duncan  YOB: 1980  Gender: female  MRN: [id number]      CC: Hip Pain (R)       HPI: Jax Duncan is a 44 y.o. female who returns for follow up on the right hip.  She is doing well, the hip is doing great and her knee pain has improved significantly.          Physical Examination:  General: no acute distress  Lungs: breathing easily  CV: regular rhythm by pulse  Right Hip: Still has some tightness in her adductor limiting her external rotation about 30.  Otherwise range of motion is full of the hip and painless.    The right knee she does have some tenderness of the hamstrings medially and posterior medially to mild tenderness over the patellar tendinitis.  No effusion no joint line tenderness        Assessment:     ICD-10-CM    1. Status post orthopedic surgery, follow-up exam  Z09           Plan:   Right hip progressing well she still has some adductor tightness that may benefit from dry needling and continued stretching and physical therapy as needed.  I think a lot of her knee pain is referred hamstring pain and strengthening.  We provided some home exercise hamstring strengthening program today.  We can see her back as needed.            Jurgen Best MD, FAAOS  Orthopaedics and Sports Medicine

## 2024-09-13 ENCOUNTER — PATIENT MESSAGE (OUTPATIENT)
Dept: ORTHOPEDIC SURGERY | Age: 44
End: 2024-09-13

## 2024-09-13 DIAGNOSIS — M25.551 RIGHT HIP PAIN: Primary | ICD-10-CM

## 2024-09-19 ENCOUNTER — HOSPITAL ENCOUNTER (OUTPATIENT)
Dept: PHYSICAL THERAPY | Age: 44
Setting detail: RECURRING SERIES
Discharge: HOME | End: 2024-09-22
Attending: ORTHOPAEDIC SURGERY
Payer: COMMERCIAL

## 2024-09-19 DIAGNOSIS — M79.604 PAIN OF RIGHT LOWER EXTREMITY: Primary | ICD-10-CM

## 2024-09-19 DIAGNOSIS — M25.561 ACUTE PAIN OF RIGHT KNEE: ICD-10-CM

## 2024-09-19 PROCEDURE — 97140 MANUAL THERAPY 1/> REGIONS: CPT

## 2024-09-19 PROCEDURE — 97161 PT EVAL LOW COMPLEX 20 MIN: CPT

## 2024-09-20 ENCOUNTER — HOSPITAL ENCOUNTER (OUTPATIENT)
Dept: PHYSICAL THERAPY | Age: 44
Setting detail: RECURRING SERIES
Discharge: HOME | End: 2024-09-23
Attending: ORTHOPAEDIC SURGERY
Payer: COMMERCIAL

## 2024-09-20 PROCEDURE — 97140 MANUAL THERAPY 1/> REGIONS: CPT

## 2024-09-20 PROCEDURE — 97110 THERAPEUTIC EXERCISES: CPT

## 2024-09-23 ENCOUNTER — HOSPITAL ENCOUNTER (OUTPATIENT)
Dept: PHYSICAL THERAPY | Age: 44
Setting detail: RECURRING SERIES
Discharge: HOME | End: 2024-09-26
Attending: ORTHOPAEDIC SURGERY
Payer: COMMERCIAL

## 2024-09-23 PROCEDURE — 97140 MANUAL THERAPY 1/> REGIONS: CPT

## 2024-09-23 PROCEDURE — 97110 THERAPEUTIC EXERCISES: CPT

## 2024-09-24 ENCOUNTER — HOSPITAL ENCOUNTER (OUTPATIENT)
Dept: MAMMOGRAPHY | Age: 44
Discharge: HOME | End: 2024-09-27
Payer: COMMERCIAL

## 2024-09-24 VITALS — HEIGHT: 67 IN | WEIGHT: 176 LBS | BODY MASS INDEX: 27.62 KG/M2

## 2024-09-24 DIAGNOSIS — Z12.31 VISIT FOR SCREENING MAMMOGRAM: ICD-10-CM

## 2024-09-24 PROCEDURE — 77063 BREAST TOMOSYNTHESIS BI: CPT

## 2024-09-26 ENCOUNTER — HOSPITAL ENCOUNTER (OUTPATIENT)
Dept: PHYSICAL THERAPY | Age: 44
Setting detail: RECURRING SERIES
Discharge: HOME | End: 2024-09-29
Attending: ORTHOPAEDIC SURGERY
Payer: COMMERCIAL

## 2024-09-26 PROCEDURE — 97110 THERAPEUTIC EXERCISES: CPT

## 2024-09-26 PROCEDURE — 97140 MANUAL THERAPY 1/> REGIONS: CPT

## 2024-09-26 NOTE — PROGRESS NOTES
Jax Duncan  : 1980  Primary: Umr (Commercial)  Secondary:  [facility] @ [address]  [place] [address]  Phone: [phone]  Fax: [phone] Plan Frequency: 1-2 times a week    Plan of Care/Certification Expiration Date: 24        Plan of Care/Certification Expiration Date:  Plan of Care/Certification Expiration Date: 24    Frequency/Duration: Plan Frequency: 1-2 times a week      Time In/Out:   Time In: 1145  Time Out: 1230      PT Visit Info:         Visit Count:  4    OUTPATIENT PHYSICAL THERAPY:   Treatment Note 2024       Episode  (R quad pain)               Treatment Diagnosis:    Pain of right lower extremity  Acute pain of right knee  Medical/Referring Diagnosis:    Right hip pain [M25.551]    Referring Physician:  Jurgen Best MD MD Orders:  PT Eval and Treat   Return MD Appt:  tbd    Date of Onset:  Onset Date: 24     Allergies:   Escitalopram and Hydrocodone-acetaminophen  Restrictions/Precautions:   None      Interventions Planned (Treatment may consist of any combination of the following):     See Assessment Note    Subjective Comments:   \"Its still about the same.\"   Initial Pain Level::    2  /10  Post Session Pain Level:     2   /10  Medications Last Reviewed:  2024  Updated Objective Findings:  None Today  Treatment   TREATMENT:   THERAPEUTIC ACTIVITY: ( see below for minutes): Therapeutic activities per grid below to improve mobility, strength, balance and coordination. Required minimal visual, verbal, manual and tactile cues to improve independence and safety with daily activities .  THERAPEUTIC EXERCISE: (see below for minutes): Exercises per grid below to improve mobility, strength, balance and coordination. Required minimal verbal and manual cues to promote proper body alignment, promote proper body posture and promote proper body mechanics. Progressed resistance, range, repetitions and complexity of

## 2024-09-30 ENCOUNTER — APPOINTMENT (OUTPATIENT)
Dept: PHYSICAL THERAPY | Age: 44
End: 2024-09-30
Attending: ORTHOPAEDIC SURGERY
Payer: COMMERCIAL

## 2024-10-15 ENCOUNTER — OFFICE VISIT (OUTPATIENT)
Dept: ORTHOPEDIC SURGERY | Age: 44
End: 2024-10-15
Payer: COMMERCIAL

## 2024-10-15 DIAGNOSIS — M25.561 RIGHT KNEE PAIN, UNSPECIFIED CHRONICITY: Primary | ICD-10-CM

## 2024-10-15 PROCEDURE — 20610 DRAIN/INJ JOINT/BURSA W/O US: CPT | Performed by: PHYSICIAN ASSISTANT

## 2024-10-15 PROCEDURE — 99213 OFFICE O/P EST LOW 20 MIN: CPT | Performed by: PHYSICIAN ASSISTANT

## 2024-10-16 RX ORDER — TRIAMCINOLONE ACETONIDE 40 MG/ML
40 INJECTION, SUSPENSION INTRA-ARTICULAR; INTRAMUSCULAR ONCE
Status: COMPLETED | OUTPATIENT
Start: 2024-10-16 | End: 2024-10-16

## 2024-10-16 RX ADMIN — TRIAMCINOLONE ACETONIDE 40 MG: 40 INJECTION, SUSPENSION INTRA-ARTICULAR; INTRAMUSCULAR at 11:33

## 2024-10-16 NOTE — PROGRESS NOTES
Name: Jax Duncan  YOB: 1980  Gender: female  MRN: [id number]    CC: Knee Pain (R)     HPI: Jax Duncan is a 44 y.o. female who returns for follow up on on her right knee.  She was last seen in July reporting of right hip pain.  She was referred to Lolis Morales at [facility] to try dry needling to help with this pain.  She does feel like she has gotten relief from the dry needling.  She is here today regarding her continued right knee pain.  She states that both of her PTs and primary care physician thinks she may have a meniscus tear based on her mechanical symptoms.  Her physical therapist think she would benefit from a cortisone injection to help with inflammation.     Physical Examination:  General: no acute distress  Lungs: breathing easily  CV: regular rhythm by pulse  Right Knee: No previous surgical scars present. No joint effusion present. Patella tracks centrally with no patellofemoral grind. Neutral mechanical alignment present. Passive ROM:  3 degrees of hyperextension with 120 degrees of flexion. Stable to varus and valgus stress at full extension and 30 degrees of flexion. Negative Lachman's. negative anterior drawer. negative posterior drawer.  No pain with McMurrays over medial or lateral joint line. Tender to palpate over Medial joint line. Calf is soft and nontender to palpation. Motor and sensory intact distally. Dorsalis pedis pulse 2+.      Assessment:     ICD-10-CM    1. Right knee pain, unspecified chronicity  M25.561 triamcinolone acetonide (KENALOG-40) injection 40 mg     DRAIN/INJECT LARGE JOINT/BURSA          Plan:     Discussed with Ms. Duncan that I am glad that her pain from her hamstring tendons have improved.  I like for her to continue to work on physical therapy.  I do think we should try an intra-articular injection today.  We discussed that this injection would be therapeutic as well as diagnostic.  If this injection helps that means there is

## 2024-11-13 ENCOUNTER — OFFICE VISIT (OUTPATIENT)
Dept: FAMILY MEDICINE CLINIC | Facility: CLINIC | Age: 44
End: 2024-11-13
Payer: COMMERCIAL

## 2024-11-13 VITALS
HEIGHT: 67 IN | WEIGHT: 191.1 LBS | TEMPERATURE: 98 F | HEART RATE: 74 BPM | BODY MASS INDEX: 29.99 KG/M2 | DIASTOLIC BLOOD PRESSURE: 75 MMHG | RESPIRATION RATE: 16 BRPM | OXYGEN SATURATION: 98 % | SYSTOLIC BLOOD PRESSURE: 124 MMHG

## 2024-11-13 DIAGNOSIS — J01.00 ACUTE MAXILLARY SINUSITIS, RECURRENCE NOT SPECIFIED: ICD-10-CM

## 2024-11-13 DIAGNOSIS — J02.9 SORE THROAT: Primary | ICD-10-CM

## 2024-11-13 LAB
EXP DATE SOLUTION: NORMAL
EXP DATE SWAB: NORMAL
EXPIRATION DATE: NORMAL
GROUP A STREP ANTIGEN, POC: NEGATIVE
LOT NUMBER POC: NORMAL
LOT NUMBER SOLUTION: NORMAL
LOT NUMBER SWAB: NORMAL
SARS-COV-2 RNA, POC: NEGATIVE
VALID INTERNAL CONTROL, POC: YES

## 2024-11-13 PROCEDURE — 99213 OFFICE O/P EST LOW 20 MIN: CPT | Performed by: PHYSICIAN ASSISTANT

## 2024-11-13 PROCEDURE — 87635 SARS-COV-2 COVID-19 AMP PRB: CPT | Performed by: PHYSICIAN ASSISTANT

## 2024-11-13 PROCEDURE — 87880 STREP A ASSAY W/OPTIC: CPT | Performed by: PHYSICIAN ASSISTANT

## 2024-11-13 RX ORDER — CEFUROXIME AXETIL 500 MG/1
500 TABLET ORAL 2 TIMES DAILY
Qty: 20 TABLET | Refills: 0 | Status: SHIPPED | OUTPATIENT
Start: 2024-11-13 | End: 2024-11-23

## 2024-11-13 SDOH — ECONOMIC STABILITY: FOOD INSECURITY: WITHIN THE PAST 12 MONTHS, YOU WORRIED THAT YOUR FOOD WOULD RUN OUT BEFORE YOU GOT MONEY TO BUY MORE.: NEVER TRUE

## 2024-11-13 SDOH — ECONOMIC STABILITY: FOOD INSECURITY: WITHIN THE PAST 12 MONTHS, THE FOOD YOU BOUGHT JUST DIDN'T LAST AND YOU DIDN'T HAVE MONEY TO GET MORE.: NEVER TRUE

## 2024-11-13 SDOH — ECONOMIC STABILITY: INCOME INSECURITY: HOW HARD IS IT FOR YOU TO PAY FOR THE VERY BASICS LIKE FOOD, HOUSING, MEDICAL CARE, AND HEATING?: NOT HARD AT ALL

## 2024-11-13 ASSESSMENT — PATIENT HEALTH QUESTIONNAIRE - PHQ9
SUM OF ALL RESPONSES TO PHQ9 QUESTIONS 1 & 2: 0
SUM OF ALL RESPONSES TO PHQ QUESTIONS 1-9: 0
1. LITTLE INTEREST OR PLEASURE IN DOING THINGS: NOT AT ALL
2. FEELING DOWN, DEPRESSED OR HOPELESS: NOT AT ALL
SUM OF ALL RESPONSES TO PHQ QUESTIONS 1-9: 0

## 2024-11-13 ASSESSMENT — ENCOUNTER SYMPTOMS
TROUBLE SWALLOWING: 0
EYE DISCHARGE: 0
CHEST TIGHTNESS: 0
RHINORRHEA: 1
SINUS PAIN: 1
SHORTNESS OF BREATH: 0
COUGH: 1
SINUS PRESSURE: 1
WHEEZING: 0
SORE THROAT: 1

## 2024-11-13 NOTE — PROGRESS NOTES
external ear normal.      Left Ear: Tympanic membrane, ear canal and external ear normal.      Nose: Congestion present.      Right Sinus: Maxillary sinus tenderness present. No frontal sinus tenderness.      Left Sinus: Maxillary sinus tenderness present. No frontal sinus tenderness.      Mouth/Throat:      Pharynx: Oropharynx is clear.   Eyes:      Conjunctiva/sclera: Conjunctivae normal.   Cardiovascular:      Rate and Rhythm: Normal rate and regular rhythm.      Heart sounds: Normal heart sounds.   Pulmonary:      Effort: Pulmonary effort is normal.      Breath sounds: Normal breath sounds. No wheezing, rhonchi or rales.   Musculoskeletal:      Cervical back: Neck supple.   Lymphadenopathy:      Cervical: No cervical adenopathy.   Neurological:      Mental Status: She is alert.   Psychiatric:         Mood and Affect: Mood normal.         Behavior: Behavior normal.         Thought Content: Thought content normal.       ASSESSMENT & PLAN    ICD-10-CM    1. Sore throat  J02.9 AMB POC RAPID STREP A     AMB POC COVID-19 COV      2. Acute maxillary sinusitis, recurrence not specified  J01.00 cefUROXime (CEFTIN) 500 MG tablet           1. Sore throat  *Rapid COVID and rapid strep test were negative.  - AMB POC RAPID STREP A  - AMB POC COVID-19 COV    2. Acute maxillary sinusitis, recurrence not specified  *Symptoms and exam findings are suggestive of acute sinusitis. Take the antibiotic (Ceftin) as prescribed. Recommend Tylenol for fever or pain, Mucinex DM, over the counter, for cough and congestion. Consider use of over the counter Flonase - 2 sprays per nostril once a day. If sore throat is present, may mix 1/2 to 1 tsp of table salt in 8oz of warm water and gargle every 1-2 hours. Do not swallow the salt water, it could cause nausea.  Increase fluid intake.  Make sure to wash hands frequently. Please return if symptoms don't improve or if they worsen.  - cefUROXime (CEFTIN) 500 MG tablet; Take 1 tablet by mouth 2

## 2024-11-13 NOTE — PATIENT INSTRUCTIONS
*Take the antibiotic (Ceftin) as prescribed. Recommend Tylenol for fever or pain, Mucinex DM, over the counter, for cough and congestion. Consider use of over the counter Flonase - 2 sprays per nostril once a day. If sore throat is present, may mix 1/2 to 1 tsp of table salt in 8oz of warm water and gargle every 1-2 hours. Do not swallow the salt water, it could cause nausea.  Increase fluid intake.  Make sure to wash hands frequently. Please return if symptoms don't improve or if they worsen.

## 2025-01-14 ENCOUNTER — PATIENT MESSAGE (OUTPATIENT)
Dept: FAMILY MEDICINE CLINIC | Facility: CLINIC | Age: 45
End: 2025-01-14

## 2025-01-14 DIAGNOSIS — R45.86 MOOD CHANGES: Primary | ICD-10-CM

## 2025-01-16 RX ORDER — BUPROPION HYDROCHLORIDE 150 MG/1
150 TABLET ORAL EVERY MORNING
Qty: 30 TABLET | Refills: 2 | Status: SHIPPED | OUTPATIENT
Start: 2025-01-16

## 2025-02-17 SDOH — ECONOMIC STABILITY: FOOD INSECURITY: WITHIN THE PAST 12 MONTHS, THE FOOD YOU BOUGHT JUST DIDN'T LAST AND YOU DIDN'T HAVE MONEY TO GET MORE.: NEVER TRUE

## 2025-02-17 SDOH — ECONOMIC STABILITY: INCOME INSECURITY: IN THE LAST 12 MONTHS, WAS THERE A TIME WHEN YOU WERE NOT ABLE TO PAY THE MORTGAGE OR RENT ON TIME?: NO

## 2025-02-17 SDOH — ECONOMIC STABILITY: FOOD INSECURITY: WITHIN THE PAST 12 MONTHS, YOU WORRIED THAT YOUR FOOD WOULD RUN OUT BEFORE YOU GOT MONEY TO BUY MORE.: NEVER TRUE

## 2025-02-17 SDOH — ECONOMIC STABILITY: TRANSPORTATION INSECURITY
IN THE PAST 12 MONTHS, HAS THE LACK OF TRANSPORTATION KEPT YOU FROM MEDICAL APPOINTMENTS OR FROM GETTING MEDICATIONS?: NO

## 2025-02-17 ASSESSMENT — PATIENT HEALTH QUESTIONNAIRE - PHQ9
SUM OF ALL RESPONSES TO PHQ9 QUESTIONS 1 & 2: 0
SUM OF ALL RESPONSES TO PHQ QUESTIONS 1-9: 0
SUM OF ALL RESPONSES TO PHQ QUESTIONS 1-9: 0
1. LITTLE INTEREST OR PLEASURE IN DOING THINGS: NOT AT ALL
1. LITTLE INTEREST OR PLEASURE IN DOING THINGS: NOT AT ALL
2. FEELING DOWN, DEPRESSED OR HOPELESS: NOT AT ALL
SUM OF ALL RESPONSES TO PHQ QUESTIONS 1-9: 0
SUM OF ALL RESPONSES TO PHQ QUESTIONS 1-9: 0
2. FEELING DOWN, DEPRESSED OR HOPELESS: NOT AT ALL
SUM OF ALL RESPONSES TO PHQ9 QUESTIONS 1 & 2: 0

## 2025-02-18 ENCOUNTER — OFFICE VISIT (OUTPATIENT)
Dept: FAMILY MEDICINE CLINIC | Facility: CLINIC | Age: 45
End: 2025-02-18

## 2025-02-18 VITALS
WEIGHT: 186 LBS | BODY MASS INDEX: 29.19 KG/M2 | RESPIRATION RATE: 18 BRPM | TEMPERATURE: 100.2 F | OXYGEN SATURATION: 99 % | DIASTOLIC BLOOD PRESSURE: 66 MMHG | HEART RATE: 74 BPM | HEIGHT: 67 IN | SYSTOLIC BLOOD PRESSURE: 121 MMHG

## 2025-02-18 DIAGNOSIS — R05.9 COUGH, UNSPECIFIED TYPE: ICD-10-CM

## 2025-02-18 DIAGNOSIS — J98.8 RESPIRATORY INFECTION: Primary | ICD-10-CM

## 2025-02-18 DIAGNOSIS — R45.86 MOOD CHANGES: ICD-10-CM

## 2025-02-18 DIAGNOSIS — J34.9 SINUS PROBLEM: ICD-10-CM

## 2025-02-18 LAB
EXP DATE SOLUTION: NORMAL
EXP DATE SWAB: NORMAL
EXPIRATION DATE: NORMAL
INFLUENZA A ANTIGEN, POC: NEGATIVE
INFLUENZA B ANTIGEN, POC: NEGATIVE
LOT NUMBER POC: NORMAL
LOT NUMBER SOLUTION: NORMAL
LOT NUMBER SWAB: NORMAL
SARS-COV-2 RNA, POC: NEGATIVE
VALID INTERNAL CONTROL, POC: YES

## 2025-02-18 RX ORDER — DEXTROMETHORPHAN HYDROBROMIDE AND PROMETHAZINE HYDROCHLORIDE 15; 6.25 MG/5ML; MG/5ML
5 SYRUP ORAL 4 TIMES DAILY PRN
Qty: 180 ML | Refills: 0 | Status: SHIPPED | OUTPATIENT
Start: 2025-02-18 | End: 2025-02-27

## 2025-02-18 RX ORDER — AZITHROMYCIN 250 MG/1
250 TABLET, FILM COATED ORAL SEE ADMIN INSTRUCTIONS
Qty: 6 TABLET | Refills: 0 | Status: SHIPPED | OUTPATIENT
Start: 2025-02-18 | End: 2025-02-23

## 2025-02-18 NOTE — PROGRESS NOTES
[facility] [place] [address]  Phone: [phone] Fax [phone]  Daniel Cueto M.D.  2025        Jax Duncan is a 44 y.o. female     HPI:  Patient is seen for Cough (Started on 25), Sinus Problem, Fever (101 at home), and Fatigue     Patient reports onset of chills and bodyaches and fever this past .  Her  had symptoms initially and went to urgent care with negative testing by report.  She describes significant head congestion with yellow rhinorrhea.  Has had a cough that keeps her awake at night.  Temperature has been up to 101.  Has taken vitamin C and tried to rest.    And leaving she describes how bupropion has helped her recognize her emotional lability at times and really then on occasion.  However 150 mg has not been curable with patient having taken it for a month.  Just got a prescription filled.    ROS:  Denies any chest pain dyspnea diaphoresis or edema.  No reported palpitations or tachycardia. No nausea or vomiting.  No abdominal pain. No hematochezia melena diarrhea. No dysuria or hematuria.     Allergies   Allergen Reactions    Escitalopram Other (See Comments)     spacey and tinnitis    Hydrocodone-Acetaminophen Nausea And Vomiting       Active Ambulatory Problems     Diagnosis Date Noted    Right hip pain 2024    Chronic constipation 06/10/2024    Elevated LDL cholesterol level 06/10/2024     Resolved Ambulatory Problems     Diagnosis Date Noted    Endometritis following delivery 2012    Retained placenta 2012    Abnormal uterine bleeding, postpartum 2015    Missed  06/15/2012    Femoral acetabular impingement 2024    Tear of right acetabular labrum 2024    Tear of acetabular labrum, right, initial encounter 2024     Past Medical History:   Diagnosis Date    Abnormal Pap smear     Abnormal uterine bleeding (AUB) 2015    Adopted     Anemia

## 2025-03-03 ENCOUNTER — OFFICE VISIT (OUTPATIENT)
Dept: FAMILY MEDICINE CLINIC | Facility: CLINIC | Age: 45
End: 2025-03-03
Payer: COMMERCIAL

## 2025-03-03 VITALS
OXYGEN SATURATION: 99 % | HEART RATE: 72 BPM | DIASTOLIC BLOOD PRESSURE: 78 MMHG | TEMPERATURE: 99 F | HEIGHT: 67 IN | WEIGHT: 189 LBS | BODY MASS INDEX: 29.66 KG/M2 | RESPIRATION RATE: 16 BRPM | SYSTOLIC BLOOD PRESSURE: 122 MMHG

## 2025-03-03 DIAGNOSIS — R45.86 MOOD CHANGES: Primary | ICD-10-CM

## 2025-03-03 DIAGNOSIS — E66.3 OVERWEIGHT WITH BODY MASS INDEX (BMI) OF 29 TO 29.9 IN ADULT: ICD-10-CM

## 2025-03-03 PROCEDURE — 99214 OFFICE O/P EST MOD 30 MIN: CPT | Performed by: FAMILY MEDICINE

## 2025-03-03 RX ORDER — TOPIRAMATE 25 MG/1
TABLET, FILM COATED ORAL
Qty: 42 TABLET | Refills: 0 | Status: SHIPPED | OUTPATIENT
Start: 2025-03-03

## 2025-03-03 RX ORDER — PHENDIMETRAZINE TARTRATE 35 MG/1
TABLET ORAL
Qty: 42 TABLET | Refills: 0 | Status: CANCELLED | OUTPATIENT
Start: 2025-03-03

## 2025-03-03 RX ORDER — BUPROPION HYDROCHLORIDE 300 MG/1
300 TABLET ORAL EVERY MORNING
Qty: 90 TABLET | Refills: 1 | Status: SHIPPED | OUTPATIENT
Start: 2025-03-03

## 2025-03-03 RX ORDER — TOPIRAMATE 100 MG/1
100 TABLET, FILM COATED ORAL
Qty: 30 TABLET | Refills: 4 | Status: SHIPPED | OUTPATIENT
Start: 2025-03-28

## 2025-03-03 ASSESSMENT — PATIENT HEALTH QUESTIONNAIRE - PHQ9
SUM OF ALL RESPONSES TO PHQ QUESTIONS 1-9: 0
1. LITTLE INTEREST OR PLEASURE IN DOING THINGS: NOT AT ALL
SUM OF ALL RESPONSES TO PHQ QUESTIONS 1-9: 0
2. FEELING DOWN, DEPRESSED OR HOPELESS: NOT AT ALL

## 2025-03-03 NOTE — PROGRESS NOTES
No masses palpated. No peripheral edema or cyanosis.  Moves all extremities well.          Assessment and Plan:   Diagnosis Orders   1. Mood changes  buPROPion (WELLBUTRIN XL) 300 MG extended release tablet      2. Overweight with body mass index (BMI) of 29 to 29.9 in adult  topiramate (TOPAMAX) 25 MG tablet    topiramate (TOPAMAX) 100 MG tablet        Continue Wellbutrin  mg daily with prescription sent to the pharmacy. Encouraged 150 minutes of low impact aerobic activity weekly.  Also encouraged resistance training every other day with 24-48 hours of muscle glucose uptake subsequently.  Encouraged patient mostly to focus on regular exercise and healthy diet.  Did place patient on Topamax 25 mg nightly for a week and then increase to 50 mg nightly for a week before increasing to 75 mg nightly for a week.  Then printed prescription given for grams nightly provided.  Plan fasting follow-up here for physical in June as previously scheduled.    Provided educational information on the following:      Discharge Meds:  Current Outpatient Medications   Medication Sig Dispense Refill    buPROPion (WELLBUTRIN XL) 300 MG extended release tablet Take 1 tablet by mouth every morning 90 tablet 1    topiramate (TOPAMAX) 25 MG tablet 1 tab orally qhs for 1 week, then 2 qhs for a week, then 3 qhs for a week. 42 tablet 0    [START ON 3/28/2025] topiramate (TOPAMAX) 100 MG tablet Take 1 tablet by mouth nightly 30 tablet 4    Ibuprofen (ADVIL PO) Take by mouth      Inulin (FIBER CHOICE PO) Take 1 each by mouth daily       No current facility-administered medications for this visit.         Return in about 3 months (around 6/13/2025) for fasting CPX.        Any new medications were explained today including any potential drug interactions or significant side effects.  The patient's questions in regards to this were answered today.    Dictated using voice recognition software.  Proofread, but unrecognized errors may exist.

## 2025-03-23 DIAGNOSIS — E66.3 OVERWEIGHT WITH BODY MASS INDEX (BMI) OF 29 TO 29.9 IN ADULT: ICD-10-CM

## 2025-03-24 RX ORDER — TOPIRAMATE 100 MG/1
100 TABLET, FILM COATED ORAL
Qty: 90 TABLET | Refills: 0 | Status: SHIPPED | OUTPATIENT
Start: 2025-03-24

## 2025-03-24 NOTE — TELEPHONE ENCOUNTER
Pt report she is tolerating the 75 mg with no side effect but has not seen a reduction in her appetitive or notice any weight loss. Pt agree to go up to 100 mg.

## (undated) DEVICE — BOOT INSERT PAD

## (undated) DEVICE — PORTAL ENTRY KIT

## (undated) DEVICE — SUTURE FIBERWIRE SZ 2 L38IN NONABSORBABLE BLU WHT BLK AR7201

## (undated) DEVICE — TRANSPORT CANNULA 8MM LENGTH 7, 8, 9: Brand: TRANSPORT

## (undated) DEVICE — SUTURE VCRL SZ 2-0 L36IN ABSRB UD L36MM CT-1 1/2 CIR J945H

## (undated) DEVICE — SINGLE PORT MANIFOLD: Brand: NEPTUNE 2

## (undated) DEVICE — DRAPE,TOP,102X53,STERILE: Brand: MEDLINE

## (undated) DEVICE — SLINGSHOT 70 UP: Brand: SLINGSHOT

## (undated) DEVICE — SUTURE TAPE 2 40 INX1.3 MM X PAT BLK/WHT ORDER MULT OF 12 EA

## (undated) DEVICE — 1010 S-DRAPE TOWEL DRAPE 10/BX: Brand: STERI-DRAPE™

## (undated) DEVICE — SUTURE MCRYL SZ 2-0 L36IN ABSRB UD L36MM CT-1 1/2 CIR Y945H

## (undated) DEVICE — SWIFTSTITCH HIP SUT PASS

## (undated) DEVICE — SUTURE 2 40 INX1.3 MM X PAT BLUE/DK BLUE SUTURETAPE AR7506

## (undated) DEVICE — HIP ARTHROSCOPY DR KOCH: Brand: MEDLINE INDUSTRIES, INC.

## (undated) DEVICE — SOLUTION IRRIG 3000ML 0.9% SOD CHL USP UROMATIC PLAS CONT

## (undated) DEVICE — PERINEAL POST PAD 1

## (undated) DEVICE — SPONGE GZ W4XL4IN COT 12 PLY TYP VII WVN C FLD DSGN STERILE

## (undated) DEVICE — TUBING, SUCTION, 1/4" X 10', STRAIGHT: Brand: MEDLINE

## (undated) DEVICE — TUBING PMP L16FT MAIN DISP FOR AR-6400 AR-6475

## (undated) DEVICE — Device

## (undated) DEVICE — PAD FLR CLN ABSORBENT 46X40 IN IMPERV BLU QUIK SUITE LTX

## (undated) DEVICE — ABLATOR ASPIRATING RF H50 APOLLO

## (undated) DEVICE — PAD,ABDOMINAL,5"X9",ST,LF,25/BX: Brand: MEDLINE INDUSTRIES, INC.

## (undated) DEVICE — SYRINGE MED 50ML LUERLOCK TIP